# Patient Record
Sex: FEMALE | Race: BLACK OR AFRICAN AMERICAN | NOT HISPANIC OR LATINO | Employment: OTHER | URBAN - METROPOLITAN AREA
[De-identification: names, ages, dates, MRNs, and addresses within clinical notes are randomized per-mention and may not be internally consistent; named-entity substitution may affect disease eponyms.]

---

## 2017-02-02 ENCOUNTER — LAB VISIT (OUTPATIENT)
Dept: LAB | Facility: HOSPITAL | Age: 47
End: 2017-02-02
Attending: OBSTETRICS & GYNECOLOGY
Payer: MEDICAID

## 2017-02-02 ENCOUNTER — OFFICE VISIT (OUTPATIENT)
Dept: OBSTETRICS AND GYNECOLOGY | Facility: CLINIC | Age: 47
End: 2017-02-02
Payer: MEDICAID

## 2017-02-02 VITALS
SYSTOLIC BLOOD PRESSURE: 123 MMHG | HEIGHT: 65 IN | BODY MASS INDEX: 32.06 KG/M2 | DIASTOLIC BLOOD PRESSURE: 76 MMHG | WEIGHT: 192.44 LBS

## 2017-02-02 DIAGNOSIS — Z00.00 ANNUAL PHYSICAL EXAM: Primary | ICD-10-CM

## 2017-02-02 DIAGNOSIS — K62.89 IDIOPATHIC PROCTITIS: ICD-10-CM

## 2017-02-02 DIAGNOSIS — D25.9 UTERINE LEIOMYOMA, UNSPECIFIED LOCATION: ICD-10-CM

## 2017-02-02 LAB
BASOPHILS # BLD AUTO: 0.02 K/UL
BASOPHILS NFR BLD: 0.2 %
CANCER AG125 SERPL-ACNC: 14 U/ML
DIFFERENTIAL METHOD: ABNORMAL
EOSINOPHIL # BLD AUTO: 0.1 K/UL
EOSINOPHIL NFR BLD: 0.7 %
ERYTHROCYTE [DISTWIDTH] IN BLOOD BY AUTOMATED COUNT: 14.1 %
HCT VFR BLD AUTO: 35.4 %
HGB BLD-MCNC: 11.7 G/DL
LYMPHOCYTES # BLD AUTO: 1.8 K/UL
LYMPHOCYTES NFR BLD: 21.7 %
MCH RBC QN AUTO: 28 PG
MCHC RBC AUTO-ENTMCNC: 33.1 %
MCV RBC AUTO: 85 FL
MONOCYTES # BLD AUTO: 0.5 K/UL
MONOCYTES NFR BLD: 5.6 %
NEUTROPHILS # BLD AUTO: 6 K/UL
NEUTROPHILS NFR BLD: 71.8 %
PLATELET # BLD AUTO: 298 K/UL
PMV BLD AUTO: 10.3 FL
RBC # BLD AUTO: 4.18 M/UL
TSH SERPL DL<=0.005 MIU/L-ACNC: 1.78 UIU/ML
WBC # BLD AUTO: 8.33 K/UL

## 2017-02-02 PROCEDURE — 99999 PR PBB SHADOW E&M-EST. PATIENT-LVL III: CPT | Mod: PBBFAC,,, | Performed by: OBSTETRICS & GYNECOLOGY

## 2017-02-02 PROCEDURE — 86304 IMMUNOASSAY TUMOR CA 125: CPT

## 2017-02-02 PROCEDURE — 36415 COLL VENOUS BLD VENIPUNCTURE: CPT

## 2017-02-02 PROCEDURE — 99396 PREV VISIT EST AGE 40-64: CPT | Mod: S$PBB,,, | Performed by: OBSTETRICS & GYNECOLOGY

## 2017-02-02 PROCEDURE — 84443 ASSAY THYROID STIM HORMONE: CPT

## 2017-02-02 PROCEDURE — 85025 COMPLETE CBC W/AUTO DIFF WBC: CPT

## 2017-02-02 RX ORDER — CLOTRIMAZOLE AND BETAMETHASONE DIPROPIONATE 10; .64 MG/G; MG/G
CREAM TOPICAL
Qty: 15 G | Refills: 1 | Status: ON HOLD | OUTPATIENT
Start: 2017-02-02 | End: 2017-03-08

## 2017-02-02 NOTE — PROGRESS NOTES
Subjective:      Chief Complaint:  FIBROID  Chief Complaint   Patient presents with    Gynecologic Exam       Menstrual History:    OB History      Para Term  AB TAB SAB Ectopic Multiple Living    2         2          Menarche age: 13     Patient's last menstrual period was 2017.           Objective:        History of Present Illness AND  Examination detailed DICTATE:         HISTORY OF PRESENT ILLNESS:  The patient is 46 years of age.  The patient is for   annual exam,  2, para 2, tubal ligation.  No serious medical problems.    The patient was diagnosed with fibroids, in last exam was referred to Dr. Lima for possibly hysterectomy, however, the patient did not do it.  Pap   smear in  negative.  The patient had a mammogram this morning, report is not   back yet.  The patient's cycles are regular without any problem.  No heavy   bleeding.  The patient has no problem, no complaint.    REVIEW OF SYSTEMS:  HEAD, EAR, EYES, NOSE, AND THROAT:  Negative.  CARDIORESPIRATORY:  Negative.  GASTROINTESTINAL:  Negative.  GENITOURINARY:  See present illness.  NEUROMUSCULAR:  No problem or complaint.    PHYSICAL EXAMINATION:  GENERAL:  Well-developed, well-nourished, alert, oriented female, not in acute   distress.  VITAL SIGNS:  Blood pressure 123/73, weight 192 pounds.  HEAD:  Normocephalic.  EYES:  React.  NECK:  Supple.  Thyroid is not palpable.  No nodes.  CHEST:  Clear.  HEART:  Regular sinus rhythm, no murmur.  BREASTS:  Multiple nodular areas, possible cystic fibroadenomas.  No fixation.    No skin changes, retraction, nipple changes.  Await the report on mammogram to   see if we have to do any followup.  ABDOMEN:  Upper abdomen normal.  Lower abdomen, pelvic mass almost to the   umbilicus.  No guarding, rebound or tenderness.  PELVIC:  External normal.  Vulva normal.  [place], urethral and [place] glands   are negative.  Vagina white mucoid discharge.  Cervix clear.  Uterus is    enlarged, markedly almost to the umbilicus about 20-24 weeks' size pregnancy.      was increased in size from previous exam.  Adnexa not palpable.  RECTAL:  Negative.  MUSCULOSKELETAL:  Negative.  NEUROLOGIC:  Grossly normal.  SKIN:  Clear.    IMPRESSION:  Fibroid tumor.    PLAN:  We will repeat pelvic ultrasound.  We will do CBC, CA-125.  We will see   the patient back within a week to 10 days; however, my recommendation will be   hysterectomy because of complications of fibroid.        /ls [id number] blank(s)        FELIPA/IN  dd: 02/02/2017 09:42:12 (CST)  td: 02/02/2017 21:58:09 (CST)  Doc ID   #[id number]  Job ID #[id number]    CC:         Assessment:      Diagnosis: ANNUAL   EXAM    FIBROID   ANAL   PRURITIS       Plan:      Return in 2  weeks

## 2017-02-02 NOTE — PATIENT INSTRUCTIONS

## 2017-02-07 ENCOUNTER — HOSPITAL ENCOUNTER (OUTPATIENT)
Dept: RADIOLOGY | Facility: HOSPITAL | Age: 47
Discharge: HOME | End: 2017-02-07
Attending: OBSTETRICS & GYNECOLOGY
Payer: MEDICAID

## 2017-02-07 DIAGNOSIS — D25.9 UTERINE LEIOMYOMA, UNSPECIFIED LOCATION: ICD-10-CM

## 2017-02-07 PROCEDURE — 76856 US EXAM PELVIC COMPLETE: CPT | Mod: 26,,, | Performed by: RADIOLOGY

## 2017-02-07 PROCEDURE — 76856 US EXAM PELVIC COMPLETE: CPT | Mod: TC

## 2017-02-07 PROCEDURE — 76830 TRANSVAGINAL US NON-OB: CPT | Mod: 26,,, | Performed by: RADIOLOGY

## 2017-02-09 ENCOUNTER — OFFICE VISIT (OUTPATIENT)
Dept: OBSTETRICS AND GYNECOLOGY | Facility: CLINIC | Age: 47
End: 2017-02-09
Payer: MEDICAID

## 2017-02-09 VITALS
WEIGHT: 191.81 LBS | DIASTOLIC BLOOD PRESSURE: 78 MMHG | HEIGHT: 65 IN | BODY MASS INDEX: 31.96 KG/M2 | SYSTOLIC BLOOD PRESSURE: 125 MMHG

## 2017-02-09 DIAGNOSIS — D64.9 ANEMIA, UNSPECIFIED TYPE: ICD-10-CM

## 2017-02-09 DIAGNOSIS — D25.9 UTERINE LEIOMYOMA, UNSPECIFIED LOCATION: Primary | ICD-10-CM

## 2017-02-09 PROCEDURE — 99212 OFFICE O/P EST SF 10 MIN: CPT | Mod: S$PBB,,, | Performed by: OBSTETRICS & GYNECOLOGY

## 2017-02-09 PROCEDURE — 99212 OFFICE O/P EST SF 10 MIN: CPT | Mod: PBBFAC | Performed by: OBSTETRICS & GYNECOLOGY

## 2017-02-09 PROCEDURE — 99999 PR PBB SHADOW E&M-EST. PATIENT-LVL II: CPT | Mod: PBBFAC,,, | Performed by: OBSTETRICS & GYNECOLOGY

## 2017-02-09 NOTE — MR AVS SNAPSHOT
"    [facility] [place] - [facility]/ GYN  [address]  [address]  [place] [address]  Phone: [phone]                  Bri Roque   2017 1:00 PM   Office Visit    Description:  Female : 1970   Provider:  Mukesh Hui MD   Department:  [facility] [place] - [facility]/ GYN           Reason for Visit     Results           Diagnoses this Visit        Comments    Uterine leiomyoma, unspecified location    -  Primary     Anemia, unspecified type                To Do List           Future Appointments        Provider Department Dept Phone    2017 2:30 PM Talat Lima MD [facility] [place] [facility] -[phone]      Goals (5 Years of Data)     None      Follow-Up and Disposition     Return in about 6 months (around 2017).      [facility]     [facility] Care [facility] -  Assistance  Registered nurses in the [facility] provide clinical advisement, health education, appointment booking, and other advisory services.  Call for this free service at [phone].             Medications                Verify that the below list of medications is an accurate representation of the medications you are currently taking.  If none reported, the list may be blank. If incorrect, please contact your healthcare provider. Carry this list with you in case of emergency.           Current Medications     clindamycin (CLEOCIN T) 1 % Swab Apply topically 2 (two) times daily. CLEANSE FACE DAILY.    clindamycin (CLEOCIN) 150 MG capsule TK ONE C PO  Q 6 H FOR 5 DAYS FOR INFECTION    clotrimazole-betamethasone 1-0.05% (LOTRISONE) cream Apply to affected area 2 times daily    hydrocodone-acetaminophen 5-325mg (NORCO) 5-325 mg per tablet TK 1 T PO  Q 6 H PRN P. NO DRIVING/WORKING    promethazine (PHENERGAN) 25 MG tablet TK 1 T PO  Q 6 H PRN N. NO DRIVING/WORKING           Clinical Reference Information           Your Vitals Were     BP Height Weight Last Period BMI    125/78 5' 5" (1.651 m) 87 kg (191 lb 12.8 oz) " 01/09/2017 31.92 kg/m2      Blood Pressure          Most Recent Value    BP  125/78      Allergies as of 2/9/2017     No Known Allergies      Immunizations Administered on Date of Encounter - 2/9/2017     None      [facility] Sign-Up     Activating your [facility] account is as easy as 1-2-3!     1) Visit [email or web address], select Sign Up Now, enter this activation code and your date of birth, then select Next.  [email or web address]  Expires: 3/19/2017  9:13 AM      2) Create a username and password to use when you visit [facility] in the future and select a security question in case you lose your password and select Next.    3) Enter your e-mail address and click Sign Up!    Additional Information  If you have questions, please e-mail [email or web address] or call [phone] to talk to our [facility] staff. Remember, [facility] is NOT to be used for urgent needs. For medical emergencies, dial 911.         Language Assistance Services     ATTENTION: Language assistance services are available, free of charge. Please call [phone].      ATENCIÓN: Si habla español, tiene a garcia disposición servicios gratuitos de asistencia lingüística. Llame al [phone].     CHÚ Ý: N?u b?n nói Ti?ng Vi?t, có các d?ch v? h? tr? ngôn ng? mi?n phí dành cho b?n. G?i s? [phone].         [facility] [place] complies with applicable Federal civil rights laws and does not discriminate on the basis of race, color, national origin, age, disability, or sex.

## 2017-02-09 NOTE — PROGRESS NOTES
Subjective:      Chief Complaint:  fibriOIDS  Chief Complaint   Patient presents with    Results       Menstrual History:    OB History      Para Term  AB TAB SAB Ectopic Multiple Living    2         2          Menarche age: 13     Patient's last menstrual period was 2017.           Objective:        History of Present Illness AND  Examination detailed DICTATE:       The patient is 46 years of age.  She was here for previous annual examination.    She is known to be having fibroid tumors.  She is a  2, para 2.  Her   menstrual cycles appear regular.  Bleeding is not very extensive.  Last   menstrual period was 2017.  The patient received the benefit of repeat   pelvic on ultrasonography, which indicated the fibroid has increased in size   from previous exam in .  Also, the patient is slightly anemic.  Refer the   patient to Dr. Lima for possible hysterectomy, salpingectomy.  As previously   discussed with the patient, recommendation is removal of the uterus, possible   supracervical with bilateral salpingectomy.      FELIPA/IN  dd: 2017 13:13:57 (CST)  td: 02/10/2017 07:19:52 (CST)  Doc ID   #[id number]  Job ID #[id number]    CC:           Assessment:      Diagnosis: anemia  FIBROID       Plan:      Return in 4  weeks

## 2017-02-22 ENCOUNTER — OFFICE VISIT (OUTPATIENT)
Dept: OBSTETRICS AND GYNECOLOGY | Facility: CLINIC | Age: 47
End: 2017-02-22
Payer: MEDICAID

## 2017-02-22 VITALS
BODY MASS INDEX: 32.32 KG/M2 | SYSTOLIC BLOOD PRESSURE: 122 MMHG | WEIGHT: 194 LBS | HEIGHT: 65 IN | DIASTOLIC BLOOD PRESSURE: 70 MMHG

## 2017-02-22 DIAGNOSIS — R10.2 PELVIC PAIN IN FEMALE: Primary | ICD-10-CM

## 2017-02-22 DIAGNOSIS — D25.9 UTERINE LEIOMYOMA, UNSPECIFIED LOCATION: ICD-10-CM

## 2017-02-22 DIAGNOSIS — N92.1 MENORRHAGIA WITH IRREGULAR CYCLE: ICD-10-CM

## 2017-02-22 DIAGNOSIS — N94.6 DYSMENORRHEA: ICD-10-CM

## 2017-02-22 DIAGNOSIS — N85.2 ENLARGED UTERUS: ICD-10-CM

## 2017-02-22 PROCEDURE — 99213 OFFICE O/P EST LOW 20 MIN: CPT | Mod: PBBFAC | Performed by: OBSTETRICS & GYNECOLOGY

## 2017-02-22 PROCEDURE — 99999 PR PBB SHADOW E&M-EST. PATIENT-LVL III: CPT | Mod: PBBFAC,,, | Performed by: OBSTETRICS & GYNECOLOGY

## 2017-02-22 PROCEDURE — 99213 OFFICE O/P EST LOW 20 MIN: CPT | Mod: S$PBB,,, | Performed by: OBSTETRICS & GYNECOLOGY

## 2017-02-22 NOTE — MR AVS SNAPSHOT
"    [facility] [place] - [facility]/ GYN  [address]  [address]  [place] [address]  Phone: [phone]                  Bri Roque   2017 9:00 AM   Office Visit    Description:  Female : 1970   Provider:  Talat Lima MD   Department:  [facility] [place] - [facility]/ GYN           Reason for Visit     surgery consult                To Do List           Goals (5 Years of Data)     None      [facility]     [facility] Care [facility] -  Assistance  Registered nurses in the [facility] provide clinical advisement, health education, appointment booking, and other advisory services.  Call for this free service at [phone].             Medications                Verify that the below list of medications is an accurate representation of the medications you are currently taking.  If none reported, the list may be blank. If incorrect, please contact your healthcare provider. Carry this list with you in case of emergency.           Current Medications     clindamycin (CLEOCIN) 150 MG capsule TK ONE C PO  Q 6 H FOR 5 DAYS FOR INFECTION    clotrimazole-betamethasone 1-0.05% (LOTRISONE) cream Apply to affected area 2 times daily    hydrocodone-acetaminophen 5-325mg (NORCO) 5-325 mg per tablet TK 1 T PO  Q 6 H PRN P. NO DRIVING/WORKING    promethazine (PHENERGAN) 25 MG tablet TK 1 T PO  Q 6 H PRN N. NO DRIVING/WORKING    clindamycin (CLEOCIN T) 1 % Swab Apply topically 2 (two) times daily. CLEANSE FACE DAILY.           Clinical Reference Information           Your Vitals Were     BP Height Weight Last Period BMI    122/70 5' 5" (1.651 m) 88 kg (194 lb) 2017 32.28 kg/m2      Blood Pressure          Most Recent Value    BP  122/70      Allergies as of 2017     No Known Allergies      Immunizations Administered on Date of Encounter - 2017     None      [facility] Sign-Up     Activating your [facility] account is as easy as 1-2-3!     1) Visit [email or web address], select Sign Up Now, " enter this activation code and your date of birth, then select Next.  [email or web address]  Expires: 3/19/2017  9:13 AM      2) Create a username and password to use when you visit [facility] in the future and select a security question in case you lose your password and select Next.    3) Enter your e-mail address and click Sign Up!    Additional Information  If you have questions, please e-mail [email or web address] or call [phone] to talk to our [facility] staff. Remember, [facility] is NOT to be used for urgent needs. For medical emergencies, dial 911.         Language Assistance Services     ATTENTION: Language assistance services are available, free of charge. Please call [phone].      ATENCIÓN: Si rosaura carroll, tiene a garcia disposición servicios gratuitos de asistencia lingüística. Llame al [phone].     CHÚ Ý: N?u b?n nói Ti?ng Vi?t, có các d?ch v? h? tr? ngôn ng? mi?n phí dành cho b?n. G?i s? [phone].         [facility] complies with applicable Federal civil rights laws and does not discriminate on the basis of race, color, national origin, age, disability, or sex.

## 2017-02-23 NOTE — PROGRESS NOTES
CC:  Referral from Dr. Hui    HPI:  47 yro  presents on referral from Dr. Hui to discuss surgery due to fibroids and very heavy menses.  Pt also admits cramping with menses.    Sono:  Results: Transabdominal   ultrasound of the pelvis obtained.  Patient refused transvaginal ultrasound.    The uterus  measures  14.4 x 5.1 x 10.4 cm .  The artery measured uterine fibroids, the largest measures 9.8 cm.  The endometrium measures 8mm, which is normal.   The right ovary measures  3.2 x 1.8 x 2.2 cm .     The left ovary measures 3.7 x 2.2 x 3.3 cm. Follicles seen.  Normal flow is seen to the bilateral ovaries.  No free fluid.   Impression     1. Uterine fibroids     Vitals:    17 0912   BP: 122/70     Physical Exam   Constitutional: She is oriented to person, place, and time. She appears well-developed and well-nourished.   HENT:   Head: Normocephalic and atraumatic.   Neck: Normal range of motion.   Cardiovascular: Normal rate.    Pulmonary/Chest: Effort normal.   Abdominal: Soft.   Neurological: She is alert and oriented to person, place, and time. No cranial nerve deficit. Coordination normal.   Psychiatric: She has a normal mood and affect. Her behavior is normal. Judgment and thought content normal.   Vitals reviewed.    30 minute discussion  Pt desires hysterectomy.  Due to nearly 10 cm fibroid, laparotomy is recommended.  Discussed total vs partial hyst and pt desires SAH    Assessment/Plan  1. Pelvic pain in female  Case Request Operating Room: HYSTERECTOMY-SUPRA-CERVICAL   2. Uterine leiomyoma, unspecified location  Case Request Operating Room: HYSTERECTOMY-SUPRA-CERVICAL   3. Menorrhagia with irregular cycle  Case Request Operating Room: HYSTERECTOMY-SUPRA-CERVICAL   4. Dysmenorrhea  Case Request Operating Room: HYSTERECTOMY-SUPRA-CERVICAL   5. Enlarged uterus  Case Request Operating Room: HYSTERECTOMY-SUPRA-CERVICAL     surg scheduled 3/8/17 with pre-op a few days prior

## 2017-03-06 ENCOUNTER — OFFICE VISIT (OUTPATIENT)
Dept: OBSTETRICS AND GYNECOLOGY | Facility: CLINIC | Age: 47
End: 2017-03-06
Payer: MEDICAID

## 2017-03-06 ENCOUNTER — HOSPITAL ENCOUNTER (OUTPATIENT)
Dept: PREADMISSION TESTING | Facility: HOSPITAL | Age: 47
Discharge: HOME | End: 2017-03-06
Attending: OBSTETRICS & GYNECOLOGY
Payer: MEDICAID

## 2017-03-06 VITALS
RESPIRATION RATE: 20 BRPM | SYSTOLIC BLOOD PRESSURE: 146 MMHG | OXYGEN SATURATION: 99 % | HEIGHT: 65 IN | HEART RATE: 67 BPM | TEMPERATURE: 98 F | WEIGHT: 195.25 LBS | BODY MASS INDEX: 32.53 KG/M2 | DIASTOLIC BLOOD PRESSURE: 96 MMHG

## 2017-03-06 VITALS
HEIGHT: 65 IN | WEIGHT: 194 LBS | SYSTOLIC BLOOD PRESSURE: 128 MMHG | DIASTOLIC BLOOD PRESSURE: 70 MMHG | BODY MASS INDEX: 32.32 KG/M2

## 2017-03-06 DIAGNOSIS — D64.9 ANEMIA, UNSPECIFIED TYPE: Primary | ICD-10-CM

## 2017-03-06 DIAGNOSIS — N92.1 MENORRHAGIA WITH IRREGULAR CYCLE: ICD-10-CM

## 2017-03-06 DIAGNOSIS — N94.6 DYSMENORRHEA: ICD-10-CM

## 2017-03-06 DIAGNOSIS — D25.9 UTERINE LEIOMYOMA, UNSPECIFIED LOCATION: ICD-10-CM

## 2017-03-06 DIAGNOSIS — R10.2 PELVIC PAIN IN FEMALE: ICD-10-CM

## 2017-03-06 DIAGNOSIS — Z01.818 PREOPERATIVE TESTING: Primary | ICD-10-CM

## 2017-03-06 PROBLEM — Z00.00 ANNUAL PHYSICAL EXAM: Status: RESOLVED | Noted: 2017-02-02 | Resolved: 2017-03-06

## 2017-03-06 LAB
ANION GAP SERPL CALC-SCNC: 7 MMOL/L
BASOPHILS # BLD AUTO: 0.03 K/UL
BASOPHILS NFR BLD: 0.3 %
BUN SERPL-MCNC: 12 MG/DL
CALCIUM SERPL-MCNC: 9.2 MG/DL
CHLORIDE SERPL-SCNC: 104 MMOL/L
CO2 SERPL-SCNC: 28 MMOL/L
CREAT SERPL-MCNC: 1 MG/DL
DIFFERENTIAL METHOD: ABNORMAL
EOSINOPHIL # BLD AUTO: 0.1 K/UL
EOSINOPHIL NFR BLD: 1.3 %
ERYTHROCYTE [DISTWIDTH] IN BLOOD BY AUTOMATED COUNT: 13.9 %
EST. GFR  (AFRICAN AMERICAN): >60 ML/MIN/1.73 M^2
EST. GFR  (NON AFRICAN AMERICAN): >60 ML/MIN/1.73 M^2
GLUCOSE SERPL-MCNC: 126 MG/DL
HCT VFR BLD AUTO: 35.2 %
HGB BLD-MCNC: 11.5 G/DL
LYMPHOCYTES # BLD AUTO: 1.8 K/UL
LYMPHOCYTES NFR BLD: 20 %
MCH RBC QN AUTO: 27.6 PG
MCHC RBC AUTO-ENTMCNC: 32.7 %
MCV RBC AUTO: 84 FL
MONOCYTES # BLD AUTO: 0.5 K/UL
MONOCYTES NFR BLD: 5.6 %
NEUTROPHILS # BLD AUTO: 6.6 K/UL
NEUTROPHILS NFR BLD: 72.6 %
PLATELET # BLD AUTO: 347 K/UL
PMV BLD AUTO: 10.6 FL
POTASSIUM SERPL-SCNC: 3.3 MMOL/L
RBC # BLD AUTO: 4.17 M/UL
SODIUM SERPL-SCNC: 139 MMOL/L
WBC # BLD AUTO: 9.07 K/UL

## 2017-03-06 PROCEDURE — 99499 UNLISTED E&M SERVICE: CPT | Mod: S$PBB,,, | Performed by: OBSTETRICS & GYNECOLOGY

## 2017-03-06 PROCEDURE — 99212 OFFICE O/P EST SF 10 MIN: CPT | Mod: PBBFAC | Performed by: OBSTETRICS & GYNECOLOGY

## 2017-03-06 PROCEDURE — 99999 PR PBB SHADOW E&M-EST. PATIENT-LVL II: CPT | Mod: PBBFAC,,, | Performed by: OBSTETRICS & GYNECOLOGY

## 2017-03-06 RX ORDER — KETOROLAC TROMETHAMINE 30 MG/ML
30 INJECTION, SOLUTION INTRAMUSCULAR; INTRAVENOUS EVERY 6 HOURS
Status: CANCELLED | OUTPATIENT
Start: 2017-03-06 | End: 2017-03-07

## 2017-03-06 RX ORDER — IBUPROFEN 600 MG/1
800 TABLET ORAL EVERY 8 HOURS PRN
Status: CANCELLED | OUTPATIENT
Start: 2017-03-06

## 2017-03-06 RX ORDER — SODIUM CHLORIDE AND POTASSIUM CHLORIDE 150; 900 MG/100ML; MG/100ML
INJECTION, SOLUTION INTRAVENOUS CONTINUOUS
Status: CANCELLED | OUTPATIENT
Start: 2017-03-06

## 2017-03-06 RX ORDER — ONDANSETRON 4 MG/1
8 TABLET, ORALLY DISINTEGRATING ORAL EVERY 8 HOURS PRN
Status: CANCELLED | OUTPATIENT
Start: 2017-03-06

## 2017-03-06 RX ORDER — IBUPROFEN 200 MG
800 TABLET ORAL EVERY 8 HOURS
Status: CANCELLED | OUTPATIENT
Start: 2017-03-07

## 2017-03-06 NOTE — IP AVS SNAPSHOT
[place] [address] [place] [address]  Phone: [phone]           Patient Discharge Instructions    Our goal is to set you up for success. This packet includes information on your condition, medications, and your home care. It will help you to care for yourself so you don't get sicker.     Please ask your nurse if you have any questions.        There are many details to remember when preparing for your surgery. Here is what you will need to do, please ask your nurse if there are more specific instructions and if you have any questions:    1. 24 hours before procedure Do not smoke or drink alcoholic beverages 24 hours prior to your procedure    2. Eating before procedure Do not eat or drink anything 8 hours before your procedure - this includes gum, mints, and candy.     3. Day of procedure Please remove all jewelry for the procedure. If you wear contact lenses, dentures, hearing aids or glasses, bring a container to put them in during your surgery and give to a family member for safekeeping.  If your doctor has scheduled you for an overnight stay, bring a small overnight bag with any personal items that you need.    4. After procedure Make arrangements in advance for transportation home by a responsible adult. It is not safe to drive a vehicle during the 24 hours following surgery.     PLEASE NOTE: You may be contacted the day before your surgery to confirm your surgery date and arrival time. The Surgery schedule has many variables which may affect the time of your surgery case. Family members should be available if your surgery time changes.                [facility]  Unless otherwise directed by your provider, please contact [facility], our nurse care line that is available for 24/7 assistance.     [phone] (toll-free)    Registered nurses in the [facility] provide clinical advisement, health education, appointment booking, and other advisory  services.                    ** Verify the list of medication(s) below is accurate and up to date. Carry this with you in case of emergency. If your medications have changed, please notify your healthcare provider.             Medication List      TAKE these medications        Additional Info                      clotrimazole-betamethasone 1-0.05% cream   Commonly known as:  LOTRISONE   Quantity:  15 g   Refills:  1    Instructions:  Apply to affected area 2 times daily     Begin Date    AM    Noon    PM    Bedtime       IRON ORAL   Refills:  0   Dose:  1 tablet    Instructions:  Take 1 tablet by mouth 2 (two) times daily. 45 mg---last dose 3/5/17     Begin Date    AM    Noon    PM    Bedtime                  Please bring to all follow up appointments:    1. A copy of your discharge instructions.  2. All medicines you are currently taking in their original bottles.  3. Identification and insurance card.    Please arrive 15 minutes ahead of scheduled appointment time.    Please call 24 hours in advance if you must reschedule your appointment and/or time.        Your Scheduled Appointments     Mar 06, 2017  3:30 PM CST   Pre-Admit Testing Visit with PRE-ADMIT 1, [facility]    [address]  [facility] [address]   [phone]            Mar 06, 2017  4:00 PM CST   Pre OP with Talat Lima MD   [facility] [place] [facility] [place] [place] [place]    [address]  [address]  [facility] [id number] [phone]              Your Future Surgeries/Procedures     Mar 08, 2017   Surgery with Talat Lima MD   [facility] ([facility])    [address]  [facility] [address]   [phone]                  Discharge Instructions         Your surgery is scheduled for _WED 3/8___________________.    Call [phone] between 12 p.m. and 5 p.m. on   _TUESDAY 3/7______________ to find out your arrival time for the day of your  surgery.      Please report to SAME DAY SURGERY UNIT on the 2nd FLOOR at _______ a.m.  Use front door entrance. The doors open at 0530 am.      INSTRUCTIONS IMPORTANT!!!    X¨ Do not eat or drink after 12 midnight-including water. OK to brush teeth, no   gum, candy or mints!    For this procedure you will shower at home the night before and also the morning of surgery with HIBICLENS soap provided by the pre op nurse. Do not use this soap on your face or genitals. You will shower a third time here at the hospital on the morning of surgery. Rinse completely after each shower.  Please place clean linens on your bed the night before surgery. Please wear fresh clean clothing after each shower.  No shaving of procedural area at least 4-5 days before surgery due to                        increased risk of skin irritation and/or possible infection.          _X___  Prep instructions:   SHOWER   OTHER  ______________________    _X___  Do not wear makeup, including mascara. WEARING EYE MAKEUP MAY                   LEAD TO SERIOUS EYE INJURY during surgery.  _X___  No powder, lotions or creams to surgical area.  _X___  You may wear only deodorant on the day of surgery.  _X___  Please remove all jewelry, including piercings and leave at home.  _X___  No money or valuables needed. Please leave at home.  You may bring your           cell phone.    _X___  If going home the same day, arrange for a ride home. You will not be able to   drive if Anesthesia was used.  _  _X___  Wear loose fitting clothing. Allow for dressings, bandages.  _X___  Stop Aspirin, Ibuprofen, Motrin and Aleve at least 3-5 days before                       surgery, unless otherwise instructed by your doctor, or the nurse.              You MAY use Tylenol/acetaminophen until day of surgery.  .  _X___  Call MD for temperature above 101 degrees.        I have read or had read and explained to me, and understand the above information.    Additional comments or  "instructions:Please call   [phone] if you have any questions regarding the instructions above.                 Admission Information     Date & Time Provider Department CSN    3/6/2017  3:30 PM Talat Lima MD [facility] [id number]      Care Providers     Provider Role Specialty Primary office phone    Talat Lima MD Attending Provider Obstetrics [phone]      Your Vitals Were     BP Pulse Temp Resp Height Weight    146/96 (BP Location: Left arm, Patient Position: Sitting, BP Method: Automatic) 67 97.9 °F (36.6 °C) (Oral) 20 5' 4.5" (1.638 m) 88.6 kg (195 lb 4 oz)    Last Period SpO2 BMI          03/06/2017 99% 33 kg/m2        Recent Lab Values        8/10/2015                          10:00 AM           A1C 6.0                       Allergies as of 3/6/2017     No Known Allergies      Advance Directives     An advance directive is a document which, in the event you are no longer able to make decisions for yourself, tells your healthcare team what kind of treatment you do or do not want to receive, or who you would like to make those decisions for you.  If you do not currently have an advance directive, [facility] encourages you to create one.  For more information call:  [phone]), [phone] ([phone]),  or log on to [email or web address].        Language Assistance Services     ATTENTION: Language assistance services are available, free of charge. Please call [phone].      ATENCIÓN: Si habla español, tiene a garcia disposición servicios gratuitos de asistencia lingüística. Llame al [phone].     [facility] Ý: N?u b?n nói Ti?ng Vi?t, có các d?ch v? h? tr? ngôn ng? mi?n phí dành cho b?n. G?i s? [phone].        [facility] Sign-Up     Activating your [facility] account is as easy as 1-2-3!     1) Visit [email or web address], select Sign Up Now, enter this activation code and your date of birth, then select Next.  [email or web address]  Expires: 3/19/2017  9:13 AM  "     2) Create a username and password to use when you visit [facility] in the future and select a security question in case you lose your password and select Next.    3) Enter your e-mail address and click Sign Up!    Additional Information  If you have questions, please e-mail [email or web address] or call [phone] to talk to our [facility] staff. Remember, [facility] is NOT to be used for urgent needs. For medical emergencies, dial 911.          [facility] complies with applicable Federal civil rights laws and does not discriminate on the basis of race, color, national origin, age, disability, or sex.

## 2017-03-06 NOTE — DISCHARGE INSTRUCTIONS
Your surgery is scheduled for _WED 3/8___________________.    Call [phone] between 12 p.m. and 5 p.m. on   _TUESDAY 3/7______________ to find out your arrival time for the day of your surgery.      Please report to SAME DAY SURGERY UNIT on the 2nd FLOOR at _______ a.m.  Use front door entrance. The doors open at 0530 am.      INSTRUCTIONS IMPORTANT!!!    X¨ Do not eat or drink after 12 midnight-including water. OK to brush teeth, no   gum, candy or mints!    For this procedure you will shower at home the night before and also the morning of surgery with HIBICLENS soap provided by the pre op nurse. Do not use this soap on your face or genitals. You will shower a third time here at the hospital on the morning of surgery. Rinse completely after each shower.  Please place clean linens on your bed the night before surgery. Please wear fresh clean clothing after each shower.  No shaving of procedural area at least 4-5 days before surgery due to                        increased risk of skin irritation and/or possible infection.          _X___  Prep instructions:   SHOWER   OTHER  ______________________    _X___  Do not wear makeup, including mascara. WEARING EYE MAKEUP MAY                   LEAD TO SERIOUS EYE INJURY during surgery.  _X___  No powder, lotions or creams to surgical area.  _X___  You may wear only deodorant on the day of surgery.  _X___  Please remove all jewelry, including piercings and leave at home.  _X___  No money or valuables needed. Please leave at home.  You may bring your           cell phone.    _X___  If going home the same day, arrange for a ride home. You will not be able to   drive if Anesthesia was used.  _  _X___  Wear loose fitting clothing. Allow for dressings, bandages.  _X___  Stop Aspirin, Ibuprofen, Motrin and Aleve at least 3-5 days before                       surgery, unless otherwise instructed by your doctor, or the nurse.              You MAY use Tylenol/acetaminophen until day  of surgery.  .  _X___  Call MD for temperature above 101 degrees.        I have read or had read and explained to me, and understand the above information.    Additional comments or instructions:Please call   [phone] if you have any questions regarding the instructions above.

## 2017-03-06 NOTE — PLAN OF CARE
Pre operative instructions, medication directives and pain scales/post op pain management discussed with Ms Roque. All questions were answered. Patient re-assured regarding surgical procedure and day of surgery routine as needed. Ms Roque verbalized understanding of pre-op instructions.

## 2017-03-07 ENCOUNTER — ANESTHESIA EVENT (OUTPATIENT)
Dept: SURGERY | Facility: HOSPITAL | Age: 47
DRG: 743 | End: 2017-03-07
Payer: MEDICAID

## 2017-03-07 NOTE — PROGRESS NOTES
CC:  Pre-op SAH    HPI:  47 yro  presents for pre-op due to dysmenorrhea, menorrhagia and fibroids (largest > 9 cm).   Type of hysterectomies were discussed previously and we feel SAH is most appropriate.  Pt states she refuses all blood products regardless of situation in surgery.    Sono:  Results: Transabdominal   ultrasound of the pelvis obtained.  Patient refused transvaginal ultrasound.    The uterus  measures  14.4 x 5.1 x 10.4 cm .  The artery measured uterine fibroids, the largest measures 9.8 cm.  The endometrium measures 8mm, which is normal.   The right ovary measures  3.2 x 1.8 x 2.2 cm .     The left ovary measures 3.7 x 2.2 x 3.3 cm. Follicles seen.  Normal flow is seen to the bilateral ovaries.  No free fluid.    PMHx:  Reviewed  PSHx:  Reviewed  Meds:  Reviewed  Allergies:  NKDA    Review of Systems   Constitutional: Negative.    HENT: Negative.    Respiratory: Negative.    Cardiovascular: Negative.    Gastrointestinal: Negative.    Genitourinary: Negative.    Musculoskeletal: Negative.    Neurological: Negative.    Psychiatric/Behavioral: Negative.      Vitals:    17 1612   BP: 128/70     Physical Exam   Constitutional: She is oriented to person, place, and time. She appears well-developed and well-nourished. No distress.   HENT:   Head: Normocephalic and atraumatic.   Neck: Normal range of motion. No tracheal deviation present. No thyromegaly present.   Cardiovascular: Normal rate.    Pulmonary/Chest: Effort normal. No respiratory distress.   Abdominal: Soft. She exhibits no distension and no mass. There is no tenderness. There is no rebound and no guarding.   Musculoskeletal: Normal range of motion.   Neurological: She is alert and oriented to person, place, and time. No cranial nerve deficit. Coordination normal.   Skin: She is not diaphoretic.   Psychiatric: She has a normal mood and affect. Her behavior is normal. Judgment and thought content normal.   Vitals  reviewed.    Assessment  1.  Anemia  2.  Fibroids  3.  Menorrhagia  4.  Dysmenorrhea    Plan  Pt consented for SAH, pt signed consent to REFUSE ALL BLOOD Products.  Pt fully aware that she declines any type of transfusion of all blood products even in the event of impending death.

## 2017-03-08 ENCOUNTER — SURGERY (OUTPATIENT)
Age: 47
End: 2017-03-08

## 2017-03-08 ENCOUNTER — ANESTHESIA (OUTPATIENT)
Dept: SURGERY | Facility: HOSPITAL | Age: 47
DRG: 743 | End: 2017-03-08
Payer: MEDICAID

## 2017-03-08 ENCOUNTER — HOSPITAL ENCOUNTER (INPATIENT)
Facility: HOSPITAL | Age: 47
LOS: 2 days | Discharge: HOME | DRG: 743 | End: 2017-03-10
Attending: OBSTETRICS & GYNECOLOGY | Admitting: OBSTETRICS & GYNECOLOGY
Payer: MEDICAID

## 2017-03-08 DIAGNOSIS — N92.1 MENORRHAGIA WITH IRREGULAR CYCLE: ICD-10-CM

## 2017-03-08 DIAGNOSIS — N85.2 ENLARGED UTERUS: ICD-10-CM

## 2017-03-08 DIAGNOSIS — Z90.710 S/P COMPLETE HYSTERECTOMY: Primary | ICD-10-CM

## 2017-03-08 DIAGNOSIS — R10.2 PELVIC PAIN IN FEMALE: ICD-10-CM

## 2017-03-08 DIAGNOSIS — D25.9 UTERINE LEIOMYOMA, UNSPECIFIED LOCATION: ICD-10-CM

## 2017-03-08 DIAGNOSIS — N94.6 DYSMENORRHEA: ICD-10-CM

## 2017-03-08 DIAGNOSIS — D25.1 INTRAMURAL LEIOMYOMA OF UTERUS: ICD-10-CM

## 2017-03-08 DIAGNOSIS — D64.9 ANEMIA, UNSPECIFIED TYPE: ICD-10-CM

## 2017-03-08 LAB
ABO + RH BLD: NORMAL
B-HCG UR QL: NEGATIVE
BACTERIA #/AREA URNS HPF: NORMAL /HPF
BILIRUB UR QL STRIP: NEGATIVE
BLD GP AB SCN CELLS X3 SERPL QL: NORMAL
CLARITY UR: CLEAR
COLOR UR: YELLOW
GLUCOSE UR QL STRIP: NEGATIVE
HGB UR QL STRIP: ABNORMAL
KETONES UR QL STRIP: NEGATIVE
LEUKOCYTE ESTERASE UR QL STRIP: NEGATIVE
MICROSCOPIC COMMENT: NORMAL
NITRITE UR QL STRIP: NEGATIVE
PH UR STRIP: 6 [PH] (ref 5–8)
POCT GLUCOSE: 142 MG/DL (ref 70–110)
PROT UR QL STRIP: NEGATIVE
RBC #/AREA URNS HPF: 3 /HPF (ref 0–4)
SP GR UR STRIP: 1.02 (ref 1–1.03)
SQUAMOUS #/AREA URNS HPF: 1 /HPF
URN SPEC COLLECT METH UR: ABNORMAL
UROBILINOGEN UR STRIP-ACNC: NEGATIVE EU/DL
WBC #/AREA URNS HPF: 0 /HPF (ref 0–5)

## 2017-03-08 PROCEDURE — 88307 TISSUE EXAM BY PATHOLOGIST: CPT | Mod: 26,,, | Performed by: PATHOLOGY

## 2017-03-08 PROCEDURE — 11000001 HC ACUTE MED/SURG PRIVATE ROOM

## 2017-03-08 PROCEDURE — 37000008 HC ANESTHESIA 1ST 15 MINUTES: Performed by: OBSTETRICS & GYNECOLOGY

## 2017-03-08 PROCEDURE — 25000003 PHARM REV CODE 250: Performed by: OBSTETRICS & GYNECOLOGY

## 2017-03-08 PROCEDURE — 86900 BLOOD TYPING SEROLOGIC ABO: CPT

## 2017-03-08 PROCEDURE — 25000003 PHARM REV CODE 250: Performed by: NURSE ANESTHETIST, CERTIFIED REGISTERED

## 2017-03-08 PROCEDURE — 0UT90ZZ RESECTION OF UTERUS, OPEN APPROACH: ICD-10-PCS | Performed by: OBSTETRICS & GYNECOLOGY

## 2017-03-08 PROCEDURE — 27201423 OPTIME MED/SURG SUP & DEVICES STERILE SUPPLY: Performed by: OBSTETRICS & GYNECOLOGY

## 2017-03-08 PROCEDURE — 63600175 PHARM REV CODE 636 W HCPCS: Performed by: OBSTETRICS & GYNECOLOGY

## 2017-03-08 PROCEDURE — 63600175 PHARM REV CODE 636 W HCPCS

## 2017-03-08 PROCEDURE — D9220A PRA ANESTHESIA: Mod: ANES,,, | Performed by: ANESTHESIOLOGY

## 2017-03-08 PROCEDURE — 36000708 HC OR TIME LEV III 1ST 15 MIN: Performed by: OBSTETRICS & GYNECOLOGY

## 2017-03-08 PROCEDURE — 0UTC0ZZ RESECTION OF CERVIX, OPEN APPROACH: ICD-10-PCS | Performed by: OBSTETRICS & GYNECOLOGY

## 2017-03-08 PROCEDURE — 63600175 PHARM REV CODE 636 W HCPCS: Performed by: ANESTHESIOLOGY

## 2017-03-08 PROCEDURE — D9220A PRA ANESTHESIA: Mod: CRNA,,, | Performed by: NURSE ANESTHETIST, CERTIFIED REGISTERED

## 2017-03-08 PROCEDURE — 25000003 PHARM REV CODE 250: Performed by: ANESTHESIOLOGY

## 2017-03-08 PROCEDURE — 81000 URINALYSIS NONAUTO W/SCOPE: CPT

## 2017-03-08 PROCEDURE — 81025 URINE PREGNANCY TEST: CPT

## 2017-03-08 PROCEDURE — 58150 TOTAL HYSTERECTOMY: CPT | Mod: 80,,, | Performed by: OBSTETRICS & GYNECOLOGY

## 2017-03-08 PROCEDURE — 88307 TISSUE EXAM BY PATHOLOGIST: CPT | Performed by: PATHOLOGY

## 2017-03-08 PROCEDURE — 58150 TOTAL HYSTERECTOMY: CPT | Mod: ,,, | Performed by: OBSTETRICS & GYNECOLOGY

## 2017-03-08 PROCEDURE — 71000039 HC RECOVERY, EACH ADD'L HOUR: Performed by: OBSTETRICS & GYNECOLOGY

## 2017-03-08 PROCEDURE — 71000033 HC RECOVERY, INTIAL HOUR: Performed by: OBSTETRICS & GYNECOLOGY

## 2017-03-08 PROCEDURE — 36000709 HC OR TIME LEV III EA ADD 15 MIN: Performed by: OBSTETRICS & GYNECOLOGY

## 2017-03-08 PROCEDURE — 86850 RBC ANTIBODY SCREEN: CPT

## 2017-03-08 PROCEDURE — 36415 COLL VENOUS BLD VENIPUNCTURE: CPT

## 2017-03-08 PROCEDURE — 0UT70ZZ RESECTION OF BILATERAL FALLOPIAN TUBES, OPEN APPROACH: ICD-10-PCS | Performed by: OBSTETRICS & GYNECOLOGY

## 2017-03-08 PROCEDURE — 37000009 HC ANESTHESIA EA ADD 15 MINS: Performed by: OBSTETRICS & GYNECOLOGY

## 2017-03-08 PROCEDURE — 63600175 PHARM REV CODE 636 W HCPCS: Performed by: NURSE ANESTHETIST, CERTIFIED REGISTERED

## 2017-03-08 RX ORDER — LIDOCAINE HYDROCHLORIDE 10 MG/ML
1 INJECTION, SOLUTION EPIDURAL; INFILTRATION; INTRACAUDAL; PERINEURAL ONCE
Status: DISCONTINUED | OUTPATIENT
Start: 2017-03-08 | End: 2017-03-08 | Stop reason: HOSPADM

## 2017-03-08 RX ORDER — SODIUM CHLORIDE AND POTASSIUM CHLORIDE 150; 900 MG/100ML; MG/100ML
INJECTION, SOLUTION INTRAVENOUS CONTINUOUS
Status: DISCONTINUED | OUTPATIENT
Start: 2017-03-08 | End: 2017-03-08

## 2017-03-08 RX ORDER — HYDROMORPHONE HCL IN 0.9% NACL 6 MG/30 ML
PATIENT CONTROLLED ANALGESIA SYRINGE INTRAVENOUS CONTINUOUS
Status: DISCONTINUED | OUTPATIENT
Start: 2017-03-08 | End: 2017-03-09

## 2017-03-08 RX ORDER — DIPHENHYDRAMINE HYDROCHLORIDE 50 MG/ML
12.5 INJECTION INTRAMUSCULAR; INTRAVENOUS EVERY 4 HOURS PRN
Status: DISCONTINUED | OUTPATIENT
Start: 2017-03-08 | End: 2017-03-09

## 2017-03-08 RX ORDER — IBUPROFEN 400 MG/1
800 TABLET ORAL 3 TIMES DAILY
Status: DISCONTINUED | OUTPATIENT
Start: 2017-03-09 | End: 2017-03-10 | Stop reason: HOSPADM

## 2017-03-08 RX ORDER — MIDAZOLAM HYDROCHLORIDE 1 MG/ML
INJECTION, SOLUTION INTRAMUSCULAR; INTRAVENOUS
Status: DISCONTINUED | OUTPATIENT
Start: 2017-03-08 | End: 2017-03-08

## 2017-03-08 RX ORDER — DIPHENHYDRAMINE HYDROCHLORIDE 50 MG/ML
25 INJECTION INTRAMUSCULAR; INTRAVENOUS EVERY 4 HOURS PRN
Status: DISCONTINUED | OUTPATIENT
Start: 2017-03-08 | End: 2017-03-10 | Stop reason: HOSPADM

## 2017-03-08 RX ORDER — FENTANYL CITRATE 50 UG/ML
INJECTION, SOLUTION INTRAMUSCULAR; INTRAVENOUS
Status: DISCONTINUED | OUTPATIENT
Start: 2017-03-08 | End: 2017-03-08

## 2017-03-08 RX ORDER — ONDANSETRON 8 MG/1
8 TABLET, ORALLY DISINTEGRATING ORAL EVERY 8 HOURS PRN
Status: DISCONTINUED | OUTPATIENT
Start: 2017-03-08 | End: 2017-03-08

## 2017-03-08 RX ORDER — NEOSTIGMINE METHYLSULFATE 1 MG/ML
INJECTION, SOLUTION INTRAVENOUS
Status: DISCONTINUED | OUTPATIENT
Start: 2017-03-08 | End: 2017-03-08

## 2017-03-08 RX ORDER — BISACODYL 10 MG
10 SUPPOSITORY, RECTAL RECTAL DAILY PRN
Status: DISCONTINUED | OUTPATIENT
Start: 2017-03-08 | End: 2017-03-10 | Stop reason: HOSPADM

## 2017-03-08 RX ORDER — METOCLOPRAMIDE HYDROCHLORIDE 5 MG/ML
INJECTION INTRAMUSCULAR; INTRAVENOUS
Status: DISCONTINUED | OUTPATIENT
Start: 2017-03-08 | End: 2017-03-08

## 2017-03-08 RX ORDER — KETOROLAC TROMETHAMINE 30 MG/ML
30 INJECTION, SOLUTION INTRAMUSCULAR; INTRAVENOUS EVERY 6 HOURS
Status: COMPLETED | OUTPATIENT
Start: 2017-03-08 | End: 2017-03-09

## 2017-03-08 RX ORDER — IBUPROFEN 400 MG/1
800 TABLET ORAL EVERY 8 HOURS
Status: DISCONTINUED | OUTPATIENT
Start: 2017-03-09 | End: 2017-03-08

## 2017-03-08 RX ORDER — GLYCOPYRROLATE 0.2 MG/ML
INJECTION INTRAMUSCULAR; INTRAVENOUS
Status: DISCONTINUED | OUTPATIENT
Start: 2017-03-08 | End: 2017-03-08

## 2017-03-08 RX ORDER — NALOXONE HCL 0.4 MG/ML
0.02 VIAL (ML) INJECTION
Status: DISCONTINUED | OUTPATIENT
Start: 2017-03-08 | End: 2017-03-09

## 2017-03-08 RX ORDER — PHENYLEPHRINE HYDROCHLORIDE 10 MG/ML
INJECTION INTRAVENOUS
Status: DISCONTINUED | OUTPATIENT
Start: 2017-03-08 | End: 2017-03-08

## 2017-03-08 RX ORDER — ONDANSETRON 8 MG/1
8 TABLET, ORALLY DISINTEGRATING ORAL EVERY 8 HOURS PRN
Status: DISCONTINUED | OUTPATIENT
Start: 2017-03-08 | End: 2017-03-10 | Stop reason: HOSPADM

## 2017-03-08 RX ORDER — ONDANSETRON 2 MG/ML
INJECTION INTRAMUSCULAR; INTRAVENOUS
Status: DISCONTINUED | OUTPATIENT
Start: 2017-03-08 | End: 2017-03-08

## 2017-03-08 RX ORDER — DIPHENHYDRAMINE HCL 25 MG
25 CAPSULE ORAL EVERY 4 HOURS PRN
Status: DISCONTINUED | OUTPATIENT
Start: 2017-03-08 | End: 2017-03-10 | Stop reason: HOSPADM

## 2017-03-08 RX ORDER — SODIUM CHLORIDE, SODIUM LACTATE, POTASSIUM CHLORIDE, CALCIUM CHLORIDE 600; 310; 30; 20 MG/100ML; MG/100ML; MG/100ML; MG/100ML
INJECTION, SOLUTION INTRAVENOUS CONTINUOUS
Status: DISCONTINUED | OUTPATIENT
Start: 2017-03-08 | End: 2017-03-09

## 2017-03-08 RX ORDER — OXYCODONE AND ACETAMINOPHEN 5; 325 MG/1; MG/1
1 TABLET ORAL EVERY 4 HOURS PRN
Status: DISCONTINUED | OUTPATIENT
Start: 2017-03-08 | End: 2017-03-10 | Stop reason: HOSPADM

## 2017-03-08 RX ORDER — AMOXICILLIN 250 MG
1 CAPSULE ORAL 2 TIMES DAILY
Status: DISCONTINUED | OUTPATIENT
Start: 2017-03-08 | End: 2017-03-10 | Stop reason: HOSPADM

## 2017-03-08 RX ORDER — SODIUM CHLORIDE, SODIUM LACTATE, POTASSIUM CHLORIDE, CALCIUM CHLORIDE 600; 310; 30; 20 MG/100ML; MG/100ML; MG/100ML; MG/100ML
INJECTION, SOLUTION INTRAVENOUS CONTINUOUS
Status: DISCONTINUED | OUTPATIENT
Start: 2017-03-08 | End: 2017-03-08

## 2017-03-08 RX ORDER — KETOROLAC TROMETHAMINE 30 MG/ML
30 INJECTION, SOLUTION INTRAMUSCULAR; INTRAVENOUS EVERY 6 HOURS
Status: DISCONTINUED | OUTPATIENT
Start: 2017-03-08 | End: 2017-03-08

## 2017-03-08 RX ORDER — HYDROMORPHONE HYDROCHLORIDE 2 MG/ML
0.2 INJECTION, SOLUTION INTRAMUSCULAR; INTRAVENOUS; SUBCUTANEOUS EVERY 5 MIN PRN
Status: DISCONTINUED | OUTPATIENT
Start: 2017-03-08 | End: 2017-03-08 | Stop reason: HOSPADM

## 2017-03-08 RX ORDER — LIDOCAINE HCL/PF 100 MG/5ML
SYRINGE (ML) INTRAVENOUS
Status: DISCONTINUED | OUTPATIENT
Start: 2017-03-08 | End: 2017-03-08

## 2017-03-08 RX ORDER — CEFAZOLIN SODIUM 2 G/50ML
2 SOLUTION INTRAVENOUS
Status: DISCONTINUED | OUTPATIENT
Start: 2017-03-08 | End: 2017-03-08 | Stop reason: HOSPADM

## 2017-03-08 RX ORDER — DIPHENHYDRAMINE HYDROCHLORIDE 50 MG/ML
25 INJECTION INTRAMUSCULAR; INTRAVENOUS EVERY 6 HOURS PRN
Status: DISCONTINUED | OUTPATIENT
Start: 2017-03-08 | End: 2017-03-08 | Stop reason: HOSPADM

## 2017-03-08 RX ORDER — CEFAZOLIN SODIUM 2 G/50ML
SOLUTION INTRAVENOUS
Status: COMPLETED
Start: 2017-03-08 | End: 2017-03-08

## 2017-03-08 RX ORDER — ROCURONIUM BROMIDE 10 MG/ML
INJECTION, SOLUTION INTRAVENOUS
Status: DISCONTINUED | OUTPATIENT
Start: 2017-03-08 | End: 2017-03-08

## 2017-03-08 RX ORDER — PROPOFOL 10 MG/ML
VIAL (ML) INTRAVENOUS
Status: DISCONTINUED | OUTPATIENT
Start: 2017-03-08 | End: 2017-03-08

## 2017-03-08 RX ORDER — IBUPROFEN 400 MG/1
800 TABLET ORAL EVERY 8 HOURS PRN
Status: DISCONTINUED | OUTPATIENT
Start: 2017-03-08 | End: 2017-03-08 | Stop reason: HOSPADM

## 2017-03-08 RX ORDER — ONDANSETRON 2 MG/ML
4 INJECTION INTRAMUSCULAR; INTRAVENOUS EVERY 12 HOURS PRN
Status: DISCONTINUED | OUTPATIENT
Start: 2017-03-08 | End: 2017-03-09

## 2017-03-08 RX ORDER — ONDANSETRON 2 MG/ML
4 INJECTION INTRAMUSCULAR; INTRAVENOUS EVERY 8 HOURS PRN
Status: DISCONTINUED | OUTPATIENT
Start: 2017-03-08 | End: 2017-03-08 | Stop reason: HOSPADM

## 2017-03-08 RX ORDER — SODIUM CHLORIDE 0.9 % (FLUSH) 0.9 %
3 SYRINGE (ML) INJECTION
Status: DISCONTINUED | OUTPATIENT
Start: 2017-03-08 | End: 2017-03-08 | Stop reason: HOSPADM

## 2017-03-08 RX ORDER — OXYCODONE AND ACETAMINOPHEN 10; 325 MG/1; MG/1
1 TABLET ORAL EVERY 4 HOURS PRN
Status: DISCONTINUED | OUTPATIENT
Start: 2017-03-08 | End: 2017-03-10 | Stop reason: HOSPADM

## 2017-03-08 RX ADMIN — PHENYLEPHRINE HYDROCHLORIDE 100 MCG: 10 INJECTION INTRAVENOUS at 08:03

## 2017-03-08 RX ADMIN — PROPOFOL 180 MG: 10 INJECTION, EMULSION INTRAVENOUS at 07:03

## 2017-03-08 RX ADMIN — PHENYLEPHRINE HYDROCHLORIDE 100 MCG: 10 INJECTION INTRAVENOUS at 07:03

## 2017-03-08 RX ADMIN — PROMETHAZINE HYDROCHLORIDE 6.25 MG: 25 INJECTION INTRAMUSCULAR; INTRAVENOUS at 06:03

## 2017-03-08 RX ADMIN — FENTANYL CITRATE 50 MCG: 50 INJECTION INTRAMUSCULAR; INTRAVENOUS at 07:03

## 2017-03-08 RX ADMIN — HYDROMORPHONE HYDROCHLORIDE 0.2 MG: 2 INJECTION INTRAMUSCULAR; INTRAVENOUS; SUBCUTANEOUS at 09:03

## 2017-03-08 RX ADMIN — ONDANSETRON 4 MG: 2 INJECTION, SOLUTION INTRAMUSCULAR; INTRAVENOUS at 07:03

## 2017-03-08 RX ADMIN — FENTANYL CITRATE 25 MCG: 50 INJECTION INTRAMUSCULAR; INTRAVENOUS at 09:03

## 2017-03-08 RX ADMIN — GLYCOPYRROLATE 0.6 MG: 0.2 INJECTION, SOLUTION INTRAMUSCULAR; INTRAVENOUS at 08:03

## 2017-03-08 RX ADMIN — SODIUM CHLORIDE, SODIUM LACTATE, POTASSIUM CHLORIDE, AND CALCIUM CHLORIDE: .6; .31; .03; .02 INJECTION, SOLUTION INTRAVENOUS at 06:03

## 2017-03-08 RX ADMIN — ONDANSETRON 4 MG: 2 INJECTION INTRAMUSCULAR; INTRAVENOUS at 03:03

## 2017-03-08 RX ADMIN — METOCLOPRAMIDE 10 MG: 5 INJECTION, SOLUTION INTRAMUSCULAR; INTRAVENOUS at 07:03

## 2017-03-08 RX ADMIN — ROCURONIUM BROMIDE 15 MG: 10 INJECTION, SOLUTION INTRAVENOUS at 07:03

## 2017-03-08 RX ADMIN — KETOROLAC TROMETHAMINE 30 MG: 30 INJECTION, SOLUTION INTRAMUSCULAR at 12:03

## 2017-03-08 RX ADMIN — LIDOCAINE HYDROCHLORIDE 100 MG: 20 INJECTION, SOLUTION INTRAVENOUS at 07:03

## 2017-03-08 RX ADMIN — FENTANYL CITRATE 100 MCG: 50 INJECTION INTRAMUSCULAR; INTRAVENOUS at 07:03

## 2017-03-08 RX ADMIN — SODIUM CHLORIDE, SODIUM LACTATE, POTASSIUM CHLORIDE, AND CALCIUM CHLORIDE: .6; .31; .03; .02 INJECTION, SOLUTION INTRAVENOUS at 10:03

## 2017-03-08 RX ADMIN — CEFAZOLIN SODIUM 2 G: 2 SOLUTION INTRAVENOUS at 07:03

## 2017-03-08 RX ADMIN — SODIUM CHLORIDE, SODIUM LACTATE, POTASSIUM CHLORIDE, AND CALCIUM CHLORIDE: .6; .31; .03; .02 INJECTION, SOLUTION INTRAVENOUS at 08:03

## 2017-03-08 RX ADMIN — Medication: at 10:03

## 2017-03-08 RX ADMIN — ROCURONIUM BROMIDE 35 MG: 10 INJECTION, SOLUTION INTRAVENOUS at 07:03

## 2017-03-08 RX ADMIN — NEOSTIGMINE METHYLSULFATE 4.5 MG: 1 INJECTION INTRAVENOUS at 08:03

## 2017-03-08 RX ADMIN — KETOROLAC TROMETHAMINE 30 MG: 30 INJECTION, SOLUTION INTRAMUSCULAR at 05:03

## 2017-03-08 RX ADMIN — LIDOCAINE HYDROCHLORIDE 20 MG: 20 INJECTION, SOLUTION INTRAVENOUS at 08:03

## 2017-03-08 RX ADMIN — SODIUM CHLORIDE, SODIUM LACTATE, POTASSIUM CHLORIDE, AND CALCIUM CHLORIDE: .6; .31; .03; .02 INJECTION, SOLUTION INTRAVENOUS at 07:03

## 2017-03-08 RX ADMIN — PROPOFOL 20 MG: 10 INJECTION, EMULSION INTRAVENOUS at 07:03

## 2017-03-08 RX ADMIN — HYDROMORPHONE HYDROCHLORIDE 0.2 MG: 2 INJECTION INTRAMUSCULAR; INTRAVENOUS; SUBCUTANEOUS at 10:03

## 2017-03-08 RX ADMIN — MIDAZOLAM HYDROCHLORIDE 2 MG: 1 INJECTION, SOLUTION INTRAMUSCULAR; INTRAVENOUS at 07:03

## 2017-03-08 RX ADMIN — DOCUSATE SODIUM AND SENNOSIDES 1 TABLET: 8.6; 5 TABLET, FILM COATED ORAL at 09:03

## 2017-03-08 RX ADMIN — GLYCOPYRROLATE 0.2 MG: 0.2 INJECTION, SOLUTION INTRAMUSCULAR; INTRAVENOUS at 07:03

## 2017-03-08 NOTE — INTERVAL H&P NOTE
The patient has been examined and the H&P has been reviewed:    I concur with the findings and no changes have occurred since H&P was written.    Anesthesia/Surgery risks, benefits and alternative options discussed and understood by patient/family.          Active Hospital Problems    Diagnosis  POA    Anemia [D64.9]  Yes      Resolved Hospital Problems    Diagnosis Date Resolved POA   No resolved problems to display.

## 2017-03-08 NOTE — PLAN OF CARE
Problem: Patient Care Overview  Goal: Plan of Care Review  Outcome: Ongoing (interventions implemented as appropriate)  Plan of care reviewed. Frequent checks made to ensure safety and comfort. Bed low, call bell within reach. Will continue to monitor.

## 2017-03-08 NOTE — ANESTHESIA POSTPROCEDURE EVALUATION
"Anesthesia Post Evaluation    Patient: Bri Roque    Procedure(s) Performed: Procedure(s) (LRB):  HYSTERECTOMY-SUPRA-CERVICAL (N/A)    Final Anesthesia Type: general  Patient location during evaluation: PACU  Patient participation: Yes- Able to Participate  Level of consciousness: awake  Post-procedure vital signs: reviewed and stable  Pain management: adequate  Airway patency: patent  PONV status at discharge: No PONV  Anesthetic complications: no      Cardiovascular status: stable  Respiratory status: unassisted  Hydration status: euvolemic  Follow-up not needed.        Visit Vitals    /72    Pulse 70    Temp 36.4 °C (97.6 °F) (Oral)    Resp (!) 25    Ht 5' 4.5" (1.638 m)    Wt 88.6 kg (195 lb 4 oz)    LMP 03/06/2017    SpO2 99%    Breastfeeding No    BMI 33 kg/m2       Pain/Quynh Score: Pain Assessment Performed: Yes (3/8/2017  9:13 AM)  Presence of Pain: non-verbal indicators present (3/8/2017  9:13 AM)  Pain Rating Prior to Med Admin: 0 (3/8/2017 10:25 AM)  Quynh Score: 10 (3/8/2017  9:43 AM)      "

## 2017-03-08 NOTE — BRIEF OP NOTE
[facility]  Brief Operative Note    SUMMARY     Surgery Date: 3/8/2017     Surgeon(s) and Role:     * Talat Lima MD - Primary     * Landry Mark MD - Assisting        Pre-op Diagnosis:  Dysmenorrhea [N94.6]  Enlarged uterus [N85.2]  Pelvic pain in female [R10.2]  Menorrhagia with irregular cycle [N92.1]  Uterine leiomyoma, unspecified location [D25.9]    Post-op Diagnosis:  Post-Op Diagnosis Codes:     * Dysmenorrhea [N94.6]     * Enlarged uterus [N85.2]     * Pelvic pain in female [R10.2]     * Menorrhagia with irregular cycle [N92.1]     * Uterine leiomyoma, unspecified location [D25.9]    Procedure:  WALE with bilateral salpingectomy    Anesthesia: General    Description of Procedure: uncomplicated WALE with bilateral salpingectomy    Description of the findings of the procedure: grossly enlarged, fibroid uterus    Estimated Blood Loss: 500 mL         Specimens:   Specimen (12h ago through future)    Start     Ordered    Pending  Specimen to Pathology - Surgery  Once     Comments:  Uterus    Cervix    Bilateral tubes    Pending

## 2017-03-08 NOTE — ANESTHESIA PREPROCEDURE EVALUATION
03/08/2017  Bri Roque is a 47 y.o., female   Pre-operative evaluation for Procedure(s) (LRB):  HYSTERECTOMY-SUPRA-CERVICAL (N/A)    Bri Roque is a 47 y.o. female presents for hysterectomy secondary to dysmenorrhea, menorrhagia and fibroids (largest > 9 cm). Pt states she refuses all blood products regardless of situation in surgery, Dr. Lima is aware.  Body mass index is 33 kg/(m^2).      Patient Active Problem List   Diagnosis    Uterine leiomyoma    Pelvic pain in female    Idiopathic proctitis    Anemia    Menorrhagia with irregular cycle    Dysmenorrhea    Enlarged uterus       Review of patient's allergies indicates:  No Known Allergies    No current facility-administered medications on file prior to encounter.      Current Outpatient Prescriptions on File Prior to Encounter   Medication Sig Dispense Refill    clotrimazole-betamethasone 1-0.05% (LOTRISONE) cream Apply to affected area 2 times daily 15 g 1       Past Surgical History:   Procedure Laterality Date    TUBAL LIGATION      VAGINAL DELIVERY       x 2       Social History     Social History    Marital status: Single     Spouse name: N/A    Number of children: N/A    Years of education: N/A     Occupational History    Not on file.     Social History Main Topics    Smoking status: Never Smoker    Smokeless tobacco: Not on file    Alcohol use No    Drug use: No    Sexual activity: No     Other Topics Concern    Not on file     Social History Narrative         Vital Signs Range (Last 24H):  Temp:  [36.8 °C (98.2 °F)]   Pulse:  [67]   Resp:  [18]   BP: (146)/(79)   SpO2:  [99 %]       CBC:   Recent Labs      03/06/17   1530   WBC  9.07   RBC  4.17   HGB  11.5*   HCT  35.2*   PLT  347   MCV  84   MCH  27.6   MCHC  32.7       CMP:   Recent Labs      03/06/17   1530   NA  139   K  3.3*   CL  104   CO2  28   BUN   12   CREATININE  1.0   GLU  126*   CALCIUM  9.2         [facility] Anesthesia Evaluation    I have reviewed the Patient Summary Reports.      I have reviewed the Medications.     Review of Systems  Anesthesia Hx:  No problems with previous Anesthesia Denies Hx of Anesthetic complications  History of prior surgery of interest to airway management or planning: Denies Family Hx of Anesthesia complications.   Denies Personal Hx of Anesthesia complications.   Social:  No Alcohol Use, Non-Smoker    Hematology/Oncology:     Oncology Normal    -- Anemia: Hematology Comments: Mild anemia 11/35, patient refuses all blood products, surgeon aware    EENT/Dental:EENT/Dental Normal   Cardiovascular:  Cardiovascular Normal Exercise tolerance: good     Pulmonary:  Pulmonary Normal    Renal/:  Renal/ Normal     Hepatic/GI:  Hepatic/GI Normal    OB/GYN/PEDS:  Large fibroids   Neurological:  Neurology Normal    Endocrine:  Endocrine Normal    Psych:  Psychiatric Normal           Physical Exam  General:  Obesity    Airway/Jaw/Neck:  Airway Findings: Mouth Opening: Normal Tongue: Normal  General Airway Assessment: Adult, Average  Mallampati: II  TM Distance: Normal, at least 6 cm  Jaw/Neck Findings:  Neck ROM: Normal ROM      Dental:  Dental Findings: In tact   Chest/Lungs:  Chest/Lungs Findings: Normal Respiratory Rate, Clear to auscultation     Heart/Vascular:  Heart Findings: Rate: Normal  Rhythm: Regular Rhythm        Mental Status:  Mental Status Findings:  Alert and Oriented, Cooperative         Anesthesia Plan  Type of Anesthesia, risks & benefits discussed:  Anesthesia Type:  general  Patient's Preference:   Intra-op Monitoring Plan: standard ASA monitors  Intra-op Monitoring Plan Comments:   Post Op Pain Control Plan:   Post Op Pain Control Plan Comments:   Induction:   IV  Beta Blocker:  Patient is not currently on a Beta-Blocker (No further documentation required).       Informed Consent: Patient understands risks and agrees  with Anesthesia plan.  Questions answered. Anesthesia consent signed with patient.  ASA Score: 2     Day of Surgery Review of History & Physical:    H&P update referred to the surgeon.         Ready For Surgery From Anesthesia Perspective.

## 2017-03-08 NOTE — TRANSFER OF CARE
"Anesthesia Transfer of Care Note    Patient: Bri Roque    Procedure(s) Performed: Procedure(s) (LRB):  HYSTERECTOMY-SUPRA-CERVICAL (N/A)    Patient location: PACU    Anesthesia Type: general    Transport from OR: Transported from OR on 100% O2 by closed face mask with adequate spontaneous ventilation    Post pain: adequate analgesia    Post assessment: no apparent anesthetic complications and tolerated procedure well    Post vital signs: stable    Level of consciousness: awake and responds to stimulation    Nausea/Vomiting: no nausea/vomiting    Complications: none          Last vitals:   Visit Vitals    BP (!) 146/79 (BP Location: Right arm, Patient Position: Lying, BP Method: Automatic)    Pulse 82    Temp 37.8 °C (100 °F) (Oral)    Resp 10    Ht 5' 4.5" (1.638 m)    Wt 88.6 kg (195 lb 4 oz)    LMP 03/06/2017    SpO2 100%    Breastfeeding No    BMI 33 kg/m2     "

## 2017-03-08 NOTE — OP NOTE
DATE OF PROCEDURE:  03/08/2017    PREOPERATIVE DIAGNOSES:  Dysmenorrhea, menorrhagia with irregular menses,   fibroid uterus and grossly enlarged uterus.    POSTOPERATIVE DIAGNOSES:  Dysmenorrhea, menorrhagia with irregular menses,   fibroid uterus and grossly enlarged uterus.    PROCEDURE:  Total abdominal hysterectomy with bilateral salpingectomy.    SURGEON:  Talat Lima M.D.    ASSISTANT:  Landry Mark M.D.    ESTIMATED BLOOD LOSS:  Approximately 500 mL.    PATHOLOGY:  Uterus, cervix, bilateral fallopian tubes.    COMPLICATIONS:  None noted.    PROCEDURE IN DETAIL:  After informed consent was obtained, the patient was taken   to the Operating Room and administered general endotracheal anesthesia without   difficulty.  The patient was prepped and draped in normal sterile fashion in   dorsal supine position.  A Pfannenstiel skin incision was made with the scalpel   approximately 2 cm above the symphysis pubis and carried through to the   underlying layer of the rectus fascia.  The rectus fascia was scored in the   midline and the fascial incision extended bilaterally with cautery.  Superior   aspect of this incision was grasped with [facility] clamps, elevated and tented up   and muscles of the anterior abdominal wall were dissected off sharply and   bluntly.  Attention was then turned to the inferior aspect of this incision,   which the muscles of the anterior abdominal wall were dissected off in a similar   fashion.  Rectus muscles were  in midline.  Peritoneum identified and   the peritoneal incision extended superiorly and inferiorly with good   visualization of bladder.  Due to the gross enlargement of the uterus, the   uterus was then exteriorized through the abdomen.  The utero-ovarian ligament on   the right side was clamped, cauterized and transected with the EnSeal.  The   round ligament was clamped, cauterized and transected and the cardinal ligament   and uterine arteries were clamped,  cauterized and transected in a stepwise   fashion all the way to the level of the uterosacral ligaments.  Bladder flap was   dissected off the lower uterine segment and then the utero-ovarian ligament,   round ligament and cardinal ligament and uterine arteries were clamped,   cauterized and transected in a similar fashion to the right side all the way to   the level of the uterosacral ligament.  The cervical stump was amputated from   the vagina using cautery and the vaginal cuff was closed with a series of   figure-of-eight 0 Vicryl sutures.  The fallopian tubes were then removed from   the ovaries by using the EnSeal as well and the pelvis was then copiously   irrigated with sterile water and excellent hemostasis was observed.  All   instruments and packing were then removed from the abdomen and pelvis.  The   rectus muscles were reapproximated using 2-0 chromic in a running fashion.  The   fascia was reapproximated using #1 Vicryl in a running fashion and the skin was   closed with Insorb staples.  Sponge, lap, needle, and instrument counts were   correct x2.  There were no noted intraoperative complications.  The patient   tolerated the procedure well and was taken to Recovery awake and in stable   condition.      JIL  dd: 03/08/2017 09:44:42 (CST)  td: 03/08/2017 14:24:29 (CST)  Doc ID   #[id number]  Job ID #[id number]    CC:

## 2017-03-08 NOTE — IP AVS SNAPSHOT
[place] [address] [place] [address]  Phone: [phone]           Patient Discharge Instructions     Our goal is to set you up for success. This packet includes information on your condition, medications, and your home care. It will help you to care for yourself so you don't get sicker and need to go back to the hospital.     Please ask your nurse if you have any questions.        There are many details to remember when preparing to leave the hospital. Here is what you will need to do:    1. Take your medicine. If you are prescribed medications, review your Medication List in the following pages. You may have new medications to  at the pharmacy and others that you'll need to stop taking. Review the instructions for how and when to take your medications. Talk with your doctor or nurses if you are unsure of what to do.     2. Go to your follow-up appointments. Specific follow-up information is listed in the following pages. Your may be contacted by a transition nurse or clinical provider about future appointments. Be sure we have all of the phone numbers to reach you, if needed. Please contact your provider's office if you are unable to make an appointment.     3. Watch for warning signs. Your doctor or nurse will give you detailed warning signs to watch for and when to call for assistance. These instructions may also include educational information about your condition. If you experience any of warning signs to your health, call your doctor.               [facility]  Unless otherwise directed by your provider, please contact [facility], our nurse care line that is available for 24/7 assistance.     [phone] (toll-free)    Registered nurses in the [facility] provide clinical advisement, health education, appointment booking, and other advisory services.                    ** Verify the list of medication(s) below is accurate and up to date.  Carry this with you in case of emergency. If your medications have changed, please notify your healthcare provider.             Medication List      START taking these medications        Additional Info                      ibuprofen 800 MG tablet   Commonly known as:  ADVIL,MOTRIN   Quantity:  60 tablet   Refills:  1   Dose:  800 mg    Last time this was given:  800 mg on 3/10/2017 12:48 PM   Instructions:  Take 1 tablet (800 mg total) by mouth 3 (three) times daily.     Begin Date    AM    Noon    PM    Bedtime       oxycodone-acetaminophen 5-325 mg per tablet   Commonly known as:  PERCOCET   Quantity:  30 tablet   Refills:  0   Dose:  1 tablet    Last time this was given:  1 tablet on 3/10/2017 12:48 PM   Instructions:  Take 1 tablet by mouth every 4 (four) hours as needed.     Begin Date    AM    Noon    PM    Bedtime         ASK your doctor about these medications        Additional Info                      clotrimazole-betamethasone 1-0.05% cream   Commonly known as:  LOTRISONE   Quantity:  15 g   Refills:  1    Instructions:  Apply to affected area 2 times daily     Begin Date    AM    Noon    PM    Bedtime       IRON ORAL   Refills:  0   Dose:  1 tablet    Instructions:  Take 1 tablet by mouth 2 (two) times daily. 45 mg---last dose 3/5/17     Begin Date    AM    Noon    PM    Bedtime            Where to Get Your Medications      These medications were sent to [facility]/pharmacy #[id number] [place] [address] [address]     Phone:  [phone]     ibuprofen 800 MG tablet    oxycodone-acetaminophen 5-325 mg per tablet                  Please bring to all follow up appointments:    1. A copy of your discharge instructions.  2. All medicines you are currently taking in their original bottles.  3. Identification and insurance card.    Please arrive 15 minutes ahead of scheduled appointment time.    Please call 24 hours in advance if you must reschedule your  appointment and/or time.        Your Scheduled Appointments     Mar 14, 2017  9:30 AM CDT   Routine Prenatal Visit with Talat Lima MD   [facility] - OB/ GYN ([facility])    [address] [facility] [address]  [address] [place] [id number] [phone]              Follow-up Information     Follow up with Talat Lima MD In 1 week.    Specialties:  Obstetrics, Obstetrics and Gynecology    Why:  dressing removal    Contact information:    [address] [facility]  [address]  [facility] [id number] [phone]          Discharge Instructions     Future Orders    Type And Screen Preop     Call MD for:  difficulty breathing or increased cough     Call MD for:  increased confusion or weakness     Call MD for:  persistent dizziness, light-headedness, or visual disturbances     Call MD for:  persistent nausea and vomiting or diarrhea     Call MD for:  redness, tenderness, or signs of infection (pain, swelling, redness, odor or green/yellow discharge around incision site)     Call MD for:  severe persistent headache     Call MD for:  severe uncontrolled pain     Call MD for:  temperature >100.4     Diet general     Questions:    Total calories:      Fat restriction, if any:      Protein restriction, if any:      Na restriction, if any:      Fluid restriction:      Additional restrictions:      Leave dressing on - Keep it clean, dry, and intact until clinic visit     Lifting restrictions     Weight bearing restrictions (specify)         Discharge Instructions       General Discharge Instructions:    Your bandage is water proof.  Shower like you normally do.  If the bandage starts to come off and water can get down to the incision then remove the bandage and CALL YOUR DOCTOR IMMEDIATELY!!!!!  Keep the incision clean and dry with warm soapy water and pat dry.      May follow a regular diet, unless otherwise discussed with physician.  Drink plenty fluids.  Activity as tolerated.  No lifting or heavy exercise.  No sex, douching or  "tampons.  May return to work/school as discussed with physician.  Follow MD specific instructions, follow up as instructed.    Report to MD:    Pain that is worsening   Nausea and vomiting  Temp >100.4  Heavy vaginal bleeding  Incision comes open or starts draining nasty, foul smelling discharge                      Primary Diagnosis     Your primary diagnosis was:  Uterine Fibroid      Admission Information     Date & Time Provider Department CSN    3/8/2017  5:30 AM Talat Lima MD [facility] [id number]      Care Providers     Provider Role Specialty Primary office phone    Talat Lima MD Attending Provider Obstetrics [phone]    Talat Lima MD Surgeon  Obstetrics [phone]      Your Vitals Were     BP Pulse Temp Resp Height Weight    119/69 83 98.4 °F (36.9 °C) 17 5' 4.5" (1.638 m) 88.6 kg (195 lb 4 oz)    Last Period SpO2 BMI          03/06/2017 99% 33 kg/m2        Recent Lab Values        8/10/2015                          10:00 AM           A1C 6.0                       Pending Labs     Order Current Status    Specimen to Pathology - Surgery In process      Allergies as of 3/10/2017     No Known Allergies      Advance Directives     An advance directive is a document which, in the event you are no longer able to make decisions for yourself, tells your healthcare team what kind of treatment you do or do not want to receive, or who you would like to make those decisions for you.  If you do not currently have an advance directive, [facility] encourages you to create one.  For more information call:  [phone]), [phone] ([phone]),  or log on to [email or web address].        Language Assistance Services     ATTENTION: Language assistance services are available, free of charge. Please call [phone].      ATENCIÓN: Si jerrellla glen, tiene a garcia disposición servicios gratuitos de asistencia lingüística. Llame al [phone].     CHÚ Ý: N?u b?n " nói Ti?ng Vi?t, có các d?ch v? h? tr? ngôn ng? mi?n phí dành cho b?n. G?i s? [phone].        [facility] Sign-Up     Activating your [facility] account is as easy as 1-2-3!     1) Visit [email or web address], select Sign Up Now, enter this activation code and your date of birth, then select Next.  [email or web address]  Expires: 3/19/2017  9:13 AM      2) Create a username and password to use when you visit [facility] in the future and select a security question in case you lose your password and select Next.    3) Enter your e-mail address and click Sign Up!    Additional Information  If you have questions, please e-mail [email or web address] or call [phone] to talk to our [facility] staff. Remember, [facility] is NOT to be used for urgent needs. For medical emergencies, dial 911.          [facility] complies with applicable Federal civil rights laws and does not discriminate on the basis of race, color, national origin, age, disability, or sex.

## 2017-03-09 PROCEDURE — 11000001 HC ACUTE MED/SURG PRIVATE ROOM

## 2017-03-09 PROCEDURE — 94761 N-INVAS EAR/PLS OXIMETRY MLT: CPT

## 2017-03-09 PROCEDURE — 25000003 PHARM REV CODE 250: Performed by: OBSTETRICS & GYNECOLOGY

## 2017-03-09 PROCEDURE — 63600175 PHARM REV CODE 636 W HCPCS: Performed by: OBSTETRICS & GYNECOLOGY

## 2017-03-09 RX ADMIN — OXYCODONE HYDROCHLORIDE AND ACETAMINOPHEN 1 TABLET: 5; 325 TABLET ORAL at 08:03

## 2017-03-09 RX ADMIN — BISACODYL 10 MG: 10 SUPPOSITORY RECTAL at 07:03

## 2017-03-09 RX ADMIN — OXYCODONE HYDROCHLORIDE AND ACETAMINOPHEN 1 TABLET: 5; 325 TABLET ORAL at 03:03

## 2017-03-09 RX ADMIN — KETOROLAC TROMETHAMINE 30 MG: 30 INJECTION, SOLUTION INTRAMUSCULAR at 12:03

## 2017-03-09 RX ADMIN — IBUPROFEN 800 MG: 400 TABLET, FILM COATED ORAL at 09:03

## 2017-03-09 RX ADMIN — DOCUSATE SODIUM AND SENNOSIDES 1 TABLET: 8.6; 5 TABLET, FILM COATED ORAL at 08:03

## 2017-03-09 RX ADMIN — KETOROLAC TROMETHAMINE 30 MG: 30 INJECTION, SOLUTION INTRAMUSCULAR at 05:03

## 2017-03-09 RX ADMIN — ONDANSETRON 8 MG: 8 TABLET, ORALLY DISINTEGRATING ORAL at 05:03

## 2017-03-09 RX ADMIN — DOCUSATE SODIUM AND SENNOSIDES 1 TABLET: 8.6; 5 TABLET, FILM COATED ORAL at 09:03

## 2017-03-09 RX ADMIN — IBUPROFEN 800 MG: 400 TABLET, FILM COATED ORAL at 02:03

## 2017-03-09 NOTE — PLAN OF CARE
Problem: Hysterectomy (Adult)  Goal: Signs and Symptoms of Listed Potential Problems Will be Absent, Minimized or Managed (Hysterectomy)  Signs and symptoms of listed potential problems will be absent, minimized or managed by discharge/transition of care (reference Hysterectomy (Adult) CPG).   Outcome: Ongoing (interventions implemented as appropriate)    03/09/17 1442   Hysterectomy   Problems Assessed (Hysterectomy) all   Problems Present (Hysterectomy) pain         Comments:   Pain meds given po prn

## 2017-03-10 VITALS
SYSTOLIC BLOOD PRESSURE: 119 MMHG | HEART RATE: 83 BPM | BODY MASS INDEX: 32.53 KG/M2 | DIASTOLIC BLOOD PRESSURE: 69 MMHG | TEMPERATURE: 98 F | WEIGHT: 195.25 LBS | HEIGHT: 65 IN | RESPIRATION RATE: 17 BRPM | OXYGEN SATURATION: 99 %

## 2017-03-10 PROCEDURE — 25000003 PHARM REV CODE 250: Performed by: OBSTETRICS & GYNECOLOGY

## 2017-03-10 RX ORDER — IBUPROFEN 800 MG/1
800 TABLET ORAL 3 TIMES DAILY
Qty: 60 TABLET | Refills: 1 | Status: SHIPPED | OUTPATIENT
Start: 2017-03-10 | End: 2020-09-18

## 2017-03-10 RX ORDER — OXYCODONE AND ACETAMINOPHEN 5; 325 MG/1; MG/1
1 TABLET ORAL EVERY 4 HOURS PRN
Qty: 30 TABLET | Refills: 0 | Status: SHIPPED | OUTPATIENT
Start: 2017-03-10 | End: 2020-09-18

## 2017-03-10 RX ADMIN — IBUPROFEN 800 MG: 400 TABLET, FILM COATED ORAL at 06:03

## 2017-03-10 RX ADMIN — OXYCODONE HYDROCHLORIDE AND ACETAMINOPHEN 1 TABLET: 5; 325 TABLET ORAL at 12:03

## 2017-03-10 RX ADMIN — IBUPROFEN 800 MG: 400 TABLET, FILM COATED ORAL at 12:03

## 2017-03-10 RX ADMIN — ONDANSETRON 8 MG: 8 TABLET, ORALLY DISINTEGRATING ORAL at 06:03

## 2017-03-10 NOTE — PLAN OF CARE
Problem: Patient Care Overview  Goal: Plan of Care Review  Outcome: Outcome(s) achieved Date Met:  03/10/17  VSS, BBS clear, using IS at bedside and using independently. BS +. Passing flatus, voiding and ambulating. tolerating regular diet well. Pain being managed with Motrin and percocet as needed. Patient anticipates discharge to home 3-10-17

## 2017-03-10 NOTE — DISCHARGE INSTRUCTIONS
General Discharge Instructions:    Your bandage is water proof.  Shower like you normally do.  If the bandage starts to come off and water can get down to the incision then remove the bandage and CALL YOUR DOCTOR IMMEDIATELY!!!!!  Keep the incision clean and dry with warm soapy water and pat dry.      May follow a regular diet, unless otherwise discussed with physician.  Drink plenty fluids.  Activity as tolerated.  No lifting or heavy exercise.  No sex, douching or tampons.  May return to work/school as discussed with physician.  Follow MD specific instructions, follow up as instructed.    Report to MD:    Pain that is worsening   Nausea and vomiting  Temp >100.4  Heavy vaginal bleeding  Incision comes open or starts draining nasty, foul smelling discharge

## 2017-03-10 NOTE — DISCHARGE SUMMARY
[facility]  Obstetrics & Gynecology  Discharge Summary    Patient Name: Bri Roque  MRN: [id number]  Admission Date: 3/8/2017  Hospital Length of Stay: 2 days  Discharge Date and Time:  03/10/2017 1:05 PM  Attending Physician: Talat Lima MD   Discharging Provider: Talat Lima MD  Primary Care Provider: Umm Mora MD    HPI: Pt had scheduled WALE with bilateral salpinectomy    Hospital Course: Pt admitted for scheduled surgery which was performed without complication.  Postoperatively, the pt did well.  She was ambulating, voiding spontaneously, tolerating oral pain medications and liquids.       Procedure(s) (LRB):  HYSTERECTOMY-SUPRA-CERVICAL (N/A) with bilateral salpingectomy    Consults:     Significant Diagnostic Studies:   Specimen     None          Pending Diagnostic Studies:     None        Final Active Diagnoses:    Diagnosis Date Noted POA    PRINCIPAL PROBLEM:  Uterine leiomyoma [D25.9] 06/08/2016 Yes    Enlarged uterus [N85.2] 02/22/2017 Yes    Dysmenorrhea [N94.6] 02/22/2017 Yes    Menorrhagia with irregular cycle [N92.1] 02/22/2017 Yes    Anemia [D64.9] 02/09/2017 Yes      Problems Resolved During this Admission:    Diagnosis Date Noted Date Resolved POA        Discharged Condition: good    Disposition: Home or Self Care    Follow Up:  Follow-up Information     Follow up with Talat Lima MD In 1 week.    Specialties:  Obstetrics, Obstetrics and Gynecology    Why:  dressing removal    Contact information:    [address] [place], [place] [address] [phone]          Patient Instructions:     Diet general     Lifting restrictions     Weight bearing restrictions (specify)     Call MD for:  temperature >100.4     Call MD for:  persistent nausea and vomiting or diarrhea     Call MD for:  severe uncontrolled pain     Call MD for:  redness, tenderness, or signs of infection (pain, swelling, redness, odor or green/yellow discharge around incision site)      Call MD for:  difficulty breathing or increased cough     Call MD for:  severe persistent headache     Call MD for:  persistent dizziness, light-headedness, or visual disturbances     Call MD for:  increased confusion or weakness     Leave dressing on - Keep it clean, dry, and intact until clinic visit     Type And Screen Preop   Standing Status: Future  Standing Exp. Date: 05/05/18       Medications:  Reconciled Home Medications:   Current Discharge Medication List      START taking these medications    Details   ibuprofen (ADVIL,MOTRIN) 800 MG tablet Take 1 tablet (800 mg total) by mouth 3 (three) times daily.  Qty: 60 tablet, Refills: 1      oxycodone-acetaminophen (PERCOCET) 5-325 mg per tablet Take 1 tablet by mouth every 4 (four) hours as needed.  Qty: 30 tablet, Refills: 0         STOP taking these medications       clotrimazole-betamethasone 1-0.05% (LOTRISONE) cream Comments:   Reason for Stopping:         FERROUS FUMARATE (IRON ORAL) Comments:   Reason for Stopping:               Talat Lima MD  Obstetrics & Gynecology  [facility]

## 2017-03-10 NOTE — PROGRESS NOTES
"Bri Roque is a 47 y.o. female patient.    Active Hospital Problems    Diagnosis  POA    *Uterine leiomyoma [D25.9]  Yes    Enlarged uterus [N85.2]  Yes    Dysmenorrhea [N94.6]  Yes    Menorrhagia with irregular cycle [N92.1]  Yes    Anemia [D64.9]  Yes      Resolved Hospital Problems    Diagnosis Date Resolved POA   No resolved problems to display.     Temp: 98.4 °F (36.9 °C) (03/10/17 1200)  Pulse: 83 (03/10/17 1200)  Resp: 17 (03/10/17 1200)  BP: 119/69 (03/10/17 1200)  SpO2: 99 % (03/09/17 0828)  Weight: 88.6 kg (195 lb 4 oz) (03/07/17 1430)  Height: 5' 4.5" (163.8 cm) (03/07/17 1430)    Subjective:  Symptoms:  Stable.  No shortness of breath, malaise, cough, chest pain, weakness, headache, chest pressure, anorexia, diarrhea or anxiety.  (Pt denies any c/o today; ambulating well).    Diet:  Adequate intake.  No nausea or vomiting.    Activity level: Returning to normal.    Pain:  She complains of pain that is mild.  She reports pain is improving.  Pain is requiring pain medication.      Objective:  General Appearance:  Comfortable, well-appearing, in no acute distress and not in pain.    Vital signs: (most recent): Blood pressure 119/69, pulse 83, temperature 98.4 °F (36.9 °C), resp. rate 17, height 5' 4.5" (1.638 m), weight 88.6 kg (195 lb 4 oz), last menstrual period 03/06/2017, SpO2 99 %, not currently breastfeeding.  Vital signs are normal.  No fever.    Output: Producing urine and producing stool.    Lungs:  Normal effort.    Heart: Normal rate.    Chest: Symmetric chest wall expansion.   Extremities: Normal range of motion.  There is no venous stasis, deformity or effusion.    Neurological: Patient is alert and oriented to person, place and time.    Abdomen: Abdomen is soft and non-distended.  (Wound:  aquasil dressing in place)There is no abdominal tenderness.       Assessment:    Condition: In stable condition.  Improving.   (POD #2 s/p WALE).     Plan:   Discharge home.  Encourage " ambulation.  Regular diet.        Talat Lima MD  3/10/2017

## 2017-03-14 ENCOUNTER — POSTPARTUM VISIT (OUTPATIENT)
Dept: OBSTETRICS AND GYNECOLOGY | Facility: CLINIC | Age: 47
End: 2017-03-14
Payer: MEDICAID

## 2017-03-14 VITALS
BODY MASS INDEX: 33.12 KG/M2 | DIASTOLIC BLOOD PRESSURE: 70 MMHG | WEIGHT: 194 LBS | HEIGHT: 64 IN | SYSTOLIC BLOOD PRESSURE: 120 MMHG

## 2017-03-14 DIAGNOSIS — Z09 POSTOPERATIVE EXAMINATION: Primary | ICD-10-CM

## 2017-03-14 PROCEDURE — 99999 PR PBB SHADOW E&M-EST. PATIENT-LVL III: CPT | Mod: PBBFAC,,, | Performed by: OBSTETRICS & GYNECOLOGY

## 2017-03-14 PROCEDURE — 99024 POSTOP FOLLOW-UP VISIT: CPT | Mod: ,,, | Performed by: OBSTETRICS & GYNECOLOGY

## 2017-03-14 PROCEDURE — 99213 OFFICE O/P EST LOW 20 MIN: CPT | Mod: PBBFAC | Performed by: OBSTETRICS & GYNECOLOGY

## 2017-03-14 NOTE — PROGRESS NOTES
CC:  Postop check    HPI:  Pt had WALE on 3/8/17 without complication.  Pathology is currently pending.  Pt denies any c/o; pain controlled with meds.  Pt denies any bowel or bladder problems.  Pt states she actually feels improved from prior to surgery.    Exam:  Abd:  Soft NTND  Wound:  aquasil dressing removed, incision appears to be healing well.    Assessment  Postop wound check    Plan  Continue with postop limitations  F/u 5 wks for full clearance.

## 2017-03-14 NOTE — MR AVS SNAPSHOT
"    [facility]  [address]  [address]  [place] [address]  Phone: [phone]                  Bri Roque   3/14/2017 9:30 AM   Postpartum Visit    Description:  Female : 1970   Provider:  Talat Lima MD   Department:  [facility]           Reason for Visit     Post-op Evaluation                To Do List           Future Appointments        Provider Department Dept Phone    3/14/2017 9:30 AM Talat Lima MD [facility]/ [phone]      Goals (5 Years of Data)     None      [facility]     [facility] Nurse Care Line -  Assistance  Registered nurses in the [facility] Center provide clinical advisement, health education, appointment booking, and other advisory services.  Call for this free service at [phone].             Medications                Verify that the below list of medications is an accurate representation of the medications you are currently taking.  If none reported, the list may be blank. If incorrect, please contact your healthcare provider. Carry this list with you in case of emergency.           Current Medications     ibuprofen (ADVIL,MOTRIN) 800 MG tablet Take 1 tablet (800 mg total) by mouth 3 (three) times daily.    oxycodone-acetaminophen (PERCOCET) 5-325 mg per tablet Take 1 tablet by mouth every 4 (four) hours as needed.           Clinical Reference Information           Your Vitals Were     BP Height Weight Last Period BMI    120/70 5' 4" (1.626 m) 88 kg (194 lb) 2017 33.3 kg/m2      Allergies as of 3/14/2017     No Known Allergies      Immunizations Administered on Date of Encounter - 3/14/2017     None      [facility] Sign-Up     Activating your [facility] account is as easy as 1-2-3!     1) Visit [email or web address], select Sign Up Now, enter this activation code and your date of birth, then select Next.  [email or web address]  Expires: 3/19/2017 10:13 AM      2) Create a username and password to use when " you visit [facility] in the future and select a security question in case you lose your password and select Next.    3) Enter your e-mail address and click Sign Up!    Additional Information  If you have questions, please e-mail [email or web address] or call [phone] to talk to our [facility] staff. Remember, [facility] is NOT to be used for urgent needs. For medical emergencies, dial 911.         Language Assistance Services     ATTENTION: Language assistance services are available, free of charge. Please call [phone].      ATENCIÓN: Si habla español, tiene a garcia disposición servicios gratuitos de asistencia lingüística. Llame al [phone].     CHÚ Ý: N?u b?n nói Ti?ng Vi?t, có các d?ch v? h? tr? ngôn ng? mi?n phí dành cho b?n. G?i s? [phone].         [facility] - [facility]/ GYN complies with applicable Federal civil rights laws and does not discriminate on the basis of race, color, national origin, age, disability, or sex.

## 2017-04-18 ENCOUNTER — OFFICE VISIT (OUTPATIENT)
Dept: OBSTETRICS AND GYNECOLOGY | Facility: CLINIC | Age: 47
End: 2017-04-18
Payer: MEDICAID

## 2017-04-18 VITALS
SYSTOLIC BLOOD PRESSURE: 124 MMHG | WEIGHT: 191 LBS | DIASTOLIC BLOOD PRESSURE: 62 MMHG | HEIGHT: 64 IN | BODY MASS INDEX: 32.61 KG/M2

## 2017-04-18 DIAGNOSIS — Z09 POSTOP CHECK: Primary | ICD-10-CM

## 2017-04-18 DIAGNOSIS — Z90.710 S/P TAH (TOTAL ABDOMINAL HYSTERECTOMY): ICD-10-CM

## 2017-04-18 PROBLEM — N94.6 DYSMENORRHEA: Status: RESOLVED | Noted: 2017-02-22 | Resolved: 2017-04-18

## 2017-04-18 PROBLEM — N85.2 ENLARGED UTERUS: Status: RESOLVED | Noted: 2017-02-22 | Resolved: 2017-04-18

## 2017-04-18 PROBLEM — N92.1 MENORRHAGIA WITH IRREGULAR CYCLE: Status: RESOLVED | Noted: 2017-02-22 | Resolved: 2017-04-18

## 2017-04-18 PROCEDURE — 99024 POSTOP FOLLOW-UP VISIT: CPT | Mod: ,,, | Performed by: OBSTETRICS & GYNECOLOGY

## 2017-04-18 PROCEDURE — 99212 OFFICE O/P EST SF 10 MIN: CPT | Mod: PBBFAC | Performed by: OBSTETRICS & GYNECOLOGY

## 2017-04-18 PROCEDURE — 99999 PR PBB SHADOW E&M-EST. PATIENT-LVL II: CPT | Mod: PBBFAC,,, | Performed by: OBSTETRICS & GYNECOLOGY

## 2017-04-18 NOTE — MR AVS SNAPSHOT
"    [facility] - OB/ GYN  [address]  [place] [address]  Phone: [phone]                  Bri Roque   2017 10:00 AM   Office Visit    Description:  Female : 1970   Provider:  Talat Lmia MD   Department:  [facility] - OB/ GYN           Reason for Visit     Post-op Evaluation                To Do List           Goals (5 Years of Data)     None      [facility]     [facility] Nurse Care Line -  Assistance  Unless otherwise directed by your provider, please contact [facility], our nurse care line that is available for  assistance.     Registered nurses in the [facility] provide: appointment scheduling, clinical advisement, health education, and other advisory services.  Call: [phone] (toll free)               Medications                Verify that the below list of medications is an accurate representation of the medications you are currently taking.  If none reported, the list may be blank. If incorrect, please contact your healthcare provider. Carry this list with you in case of emergency.           Current Medications     ibuprofen (ADVIL,MOTRIN) 800 MG tablet Take 1 tablet (800 mg total) by mouth 3 (three) times daily.    oxycodone-acetaminophen (PERCOCET) 5-325 mg per tablet Take 1 tablet by mouth every 4 (four) hours as needed.           Clinical Reference Information           Your Vitals Were     BP Height Weight Last Period BMI    124/62 5' 4" (1.626 m) 86.6 kg (191 lb) 2017 32.79 kg/m2      Blood Pressure          Most Recent Value    BP  124/62      Allergies as of 2017     No Known Allergies      Immunizations Administered on Date of Encounter - 2017     None      [facility] Sign-Up     Activating your [facility] account is as easy as 1-2-3!     1) Visit [email or web address], select Sign Up Now, enter this activation code and your date of birth, then select Next.  [email or web address]  Expires: 2017 10:44 AM  "     2) Create a username and password to use when you visit [facility] in the future and select a security question in case you lose your password and select Next.    3) Enter your e-mail address and click Sign Up!    Additional Information  If you have questions, please e-mail [email or web address] or call [phone] to talk to our [facility] staff. Remember, [facility] is NOT to be used for urgent needs. For medical emergencies, dial 911.         Language Assistance Services     ATTENTION: Language assistance services are available, free of charge. Please call [phone].      ATENCIÓN: Si habla español, tiene a garcia disposición servicios gratuitos de asistencia lingüística. Llame al [phone].     CHÚ Ý: N?u b?n nói Ti?ng Vi?t, có các d?ch v? h? tr? ngôn ng? mi?n phí dành cho b?n. G?i s? [phone].         [facility] - OB/ GYN complies with applicable Federal civil rights laws and does not discriminate on the basis of race, color, national origin, age, disability, or sex.

## 2017-04-18 NOTE — PROGRESS NOTES
CC:  Postop exam    HPI:  Pt had WALE w/ bilateral salpingectomy on 3/7/17 for benign indications.  Pt doing well but c/o some back pain that is upper lumbar in midline which is relieved with icy/hot and NSAID's.  Pt denies any urinary or bowel probs.  Pt is not currently sexually active for several years.    Path:  PreOperative Diagnosis  Dysmenorrhea, enlarged uterus, pelvic pain, menorrhagia with irregualr cycle, leiomyoma & anemia  PostOperative Diagnosis  Same  SPECIMEN  1) Uterus, Cervix, Bilateral Tubes  FINAL PATHOLOGIC DIAGNOSIS  Uterus and bilateral fallopian tubes, hysterectomy and bilateral salpingectomy:  - Benign leiomyomata, 7.8-1 cm in greatest dimension macroscopically.  - Secretory endometrium.  - Benign fallopian tubes with disruption and dilatation consistent with previous tubal ligation.  - Benign endocervix and ectocervix with no significant histologic abnormality.  - Negative for hyperplasia, atypia, dysplasia, and malignancy.    Exam:  Abd: soft NTND  Wound:  Well healed  Pelvic:  Pink vag mucosa; cuff well healed, no pain with bimanual    Assessment  Postop check    Plan  Discussed pt may resume full activity without limitations, stressed need for exercise

## 2018-10-01 ENCOUNTER — HOSPITAL ENCOUNTER (EMERGENCY)
Facility: HOSPITAL | Age: 48
Discharge: HOME | End: 2018-10-01
Attending: EMERGENCY MEDICINE
Payer: MEDICAID

## 2018-10-01 VITALS
RESPIRATION RATE: 18 BRPM | HEART RATE: 66 BPM | WEIGHT: 190 LBS | SYSTOLIC BLOOD PRESSURE: 136 MMHG | HEIGHT: 65 IN | OXYGEN SATURATION: 98 % | BODY MASS INDEX: 31.65 KG/M2 | TEMPERATURE: 98 F | DIASTOLIC BLOOD PRESSURE: 107 MMHG
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
## 2018-10-01 PROCEDURE — 99281 EMR DPT VST MAYX REQ PHY/QHP: CPT

## 2018-10-01 RX ORDER — SULFAMETHOXAZOLE AND TRIMETHOPRIM 800; 160 MG/1; MG/1
1 TABLET ORAL 2 TIMES DAILY
Qty: 14 TABLET | Refills: 0 | Status: SHIPPED | OUTPATIENT
Start: 2018-10-01 | End: 2018-10-08

## 2018-10-01 NOTE — ED PROVIDER NOTES
Encounter Date: 10/1/2018       History     Chief Complaint   Patient presents with    Insect Bite     pt reports insect bite to right top of foot since Saturday. pt c/o pain, itching and swelling pt reports she took motrin yesterday evening for pain and has had some relief. local redness noted to area. pt denies pain at this time     Chief complaint:  Insect sting  48-year-old says she was stung by an unknown insect on the dorsum of her right foot 2 days ago.  She has noted redness and swelling to the area.  Patient has been soaking the foot in Epsom salt and alcohol with some improvement.  She denies difficulty swallowing or breathing.  She has been taking ibuprofen with improvement but no Benadryl.  No fever.  She denies difficulty walking.      The history is provided by the patient.     Review of patient's allergies indicates:  No Known Allergies  Past Medical History:   Diagnosis Date    Anemia     No blood products      Past Surgical History:   Procedure Laterality Date    HYSTERECTOMY  2017    Dr Lima    HYSTERECTOMY-SUPRA-CERVICAL N/A 3/8/2017    [facility]    TUBAL LIGATION      VAGINAL DELIVERY       x 2     Family History   Problem Relation Age of Onset    Cancer Mother         cervical    Diabetes Mother     Heart disease Father     Diabetes Father     Stroke Maternal Grandmother     Diabetes Maternal Grandmother     Heart disease Maternal Grandfather     Heart disease Paternal Grandfather     Diabetes Brother     Diabetes Brother     Diabetes Brother      Social History     Tobacco Use    Smoking status: Never Smoker   Substance Use Topics    Alcohol use: No    Drug use: No     Review of Systems   Constitutional: Negative for fever.   Musculoskeletal: Positive for joint swelling. Negative for gait problem.   Skin: Positive for color change.   Neurological: Negative for numbness.       Physical Exam     Initial Vitals [10/01/18 0920]   BP Pulse  Resp Temp SpO2   (S) (!) 136/107 66 18 97.7 °F (36.5 °C) 98 %      MAP       --         Physical Exam    Nursing note and vitals reviewed.  Constitutional: No distress.   HENT:   Head: Normocephalic.   Cardiovascular: Intact distal pulses.   Pulmonary/Chest: No respiratory distress.   Neurological: She is alert and oriented to person, place, and time. She has normal strength. No sensory deficit.   Skin: Skin is warm and dry. There is erythema (Dorsum of the right and lateral foot, small pustule, see picture).             ED Course   Procedures  Labs Reviewed - No data to display       Imaging Results    None          Medical Decision Making:   Initial Assessment:   48-year-old presents with an area of redness to the dorsum of her right foot associated with a small pustule reportedly from an insect sting.  On exam patient's of have small pustule.  She has also got erythema and mild edema to the foot.  ED Management:  No evidence of abscess.  Patient appears to have an infected insect sting.  She will be treated with Bactrim.  She is advised to continue elevating the foot.  She should also take Benadryl as well as ibuprofen.  Patient was advised not to drive while taking the Benadryl.                      Clinical Impression:   The encounter diagnosis was Bite or sting by insect with infection.                             Sejal Henley MD  10/01/18 5318

## 2018-10-01 NOTE — DISCHARGE INSTRUCTIONS
Continue to elevate as much as possible.  Take 1-2 Benadryl every 4-6 hours until redness has resolved.

## 2018-10-01 NOTE — ED NOTES
Presented to ed with a insect bite to the top of the rt. Foot.  Redness and swelling has gone down some since yesterday.  Pt. Reported she was up all night with the itching and discomfort for the foot.   Denies pain at this time.

## 2018-10-01 NOTE — ED NOTES
Pt. To elimate soda from her diet and no added salt to her food for her elevated blood pressure. She will follow up with her pcp as an out pt. For furthure needs.  Pt. Is not on blood pressure medication.

## 2019-03-12 ENCOUNTER — OFFICE VISIT (OUTPATIENT)
Dept: OBSTETRICS AND GYNECOLOGY | Facility: CLINIC | Age: 49
End: 2019-03-12
Payer: MEDICAID

## 2019-03-12 VITALS
WEIGHT: 203.94 LBS | SYSTOLIC BLOOD PRESSURE: 130 MMHG | DIASTOLIC BLOOD PRESSURE: 96 MMHG | HEIGHT: 65 IN | BODY MASS INDEX: 33.98 KG/M2

## 2019-03-12 DIAGNOSIS — Z90.710 STATUS POST HYSTERECTOMY: ICD-10-CM

## 2019-03-12 DIAGNOSIS — Z00.00 ANNUAL PHYSICAL EXAM: Primary | ICD-10-CM

## 2019-03-12 DIAGNOSIS — N95.1 MENOPAUSAL STATE: ICD-10-CM

## 2019-03-12 DIAGNOSIS — Z01.419 ENCOUNTER FOR GYNECOLOGICAL EXAMINATION WITHOUT ABNORMAL FINDING: ICD-10-CM

## 2019-03-12 PROCEDURE — 99396 PR PREVENTIVE VISIT,EST,40-64: ICD-10-PCS | Mod: S$PBB,,, | Performed by: OBSTETRICS & GYNECOLOGY

## 2019-03-12 PROCEDURE — 99396 PREV VISIT EST AGE 40-64: CPT | Mod: S$PBB,,, | Performed by: OBSTETRICS & GYNECOLOGY

## 2019-03-12 PROCEDURE — 99999 PR PBB SHADOW E&M-EST. PATIENT-LVL III: CPT | Mod: PBBFAC,,, | Performed by: OBSTETRICS & GYNECOLOGY

## 2019-03-12 PROCEDURE — 99213 OFFICE O/P EST LOW 20 MIN: CPT | Mod: PBBFAC | Performed by: OBSTETRICS & GYNECOLOGY

## 2019-03-12 PROCEDURE — 99999 PR PBB SHADOW E&M-EST. PATIENT-LVL III: ICD-10-PCS | Mod: PBBFAC,,, | Performed by: OBSTETRICS & GYNECOLOGY

## 2019-03-12 RX ORDER — LATANOPROST 50 UG/ML
SOLUTION/ DROPS OPHTHALMIC
COMMUNITY
Start: 2018-12-16

## 2019-03-12 NOTE — PROGRESS NOTES
Subjective:      Chief Complaint:    Chief Complaint   Patient presents with    Well Woman       Menstrual History:    OB History      Para Term  AB Living    2 2 2     4    SAB TAB Ectopic Multiple Live Births            2          Menarche age: 13     Patient's last menstrual period was 2017.                Objective:      PRESENT ILLNESS, PHYSICAL EXAMINATION    HISTORY OF PRESENT ILLNESS:  The patient is 49 years of age.   2, para 2.    Pap smear in 2016, negative.     MEDICAL:  Fibroids.      SURGERY:  Hysterectomy total with bilateral salpingo-oophorectomy and tubal   ligation because of fibroids.      The patient is not on any hormone replacement therapy, has no problem or   complaints.  No problem with menopausal symptoms.    PHYSICAL EXAMINATION:  VITAL SIGNS:  Blood pressure 130/96, weight 203.  BREASTS:  No lumps, mass, discharge, skin changes, retraction, nipple changes.    Axilla negative.  PELVIC:  External normal.  Vulva normal.  Bartholin, urethra and Kerr glands   are negative.  Vagina is clear, good rugal pattern.  Cervix, uterus, and adnexa   absent.  Good apical support.  No pain elicited on exam.  No mass.  RECTAL:  Negative.    IMPRESSION:  Essentially normal postmenopausal exam.    PLAN:  We will do a mammogram.    RECOMMENDATION:  Multivitamins, calcium, vitamin D, and increased activity, and   we will see the patient back within a year.      JIV/IN  dd: 2019 14:57:16 (CDT)  td: 2019 05:03:03 (CDT)  Doc ID   #[id number]  Job ID #[id number]    CC:       History of Present Illness AND  Examination detailed DICTATE:        Physical Exam   Constitutional: She is oriented to person, place, and time. She appears well-developed and well-nourished. No distress.   HENT:   Head: Normocep.  Eyes: Pupils are equal, round, and reactive to light.   Neck: Neck supple..   Cardiovascular: Normal rate, regular rhythm and normal heart sounds. No murmur  heard.  Pulmonary/Chest: Effort normal and breath sounds normal. No respiratory distress. She has no wheezes. She has no rales. She exhibits no tenderness.   Abdominal: Bowel sounds are normal. She exhibits no distension and no mass. There is no tenderness. There is no rebound and no guarding.   Musculoskeletal: Normal range of motion.   Lymphadenopathy:        Right: No inguinal adenopathy present.        Left: No inguinal adenopathy present.   Neurological: She is alert and oriented.  Skin: Skin is warm. No rash noted.        Review of Systems  Review of Systems   Normal ROS:   Constitutional: Negative for fever, chills, activity change fatigue and unexpected weight change.   HENT: Negative for nosebleeds, congestion.  Eyes: Negative for visual disturbance.   Respiratory: Negative for shortness of breath and wheezing.    Cardiovascular: Negative for chest pain, palpitations.   Gastrointestinal: Negative for abdominal pain, diarrhea, constipation, blood in stool and abdominal distention.   Musculoskeletal: Negative for back pain.   Allergic/Immunologic: Negative .   Neurological: Negati.   Hematological: Negati   Psychiatric/Behavioral: Negative     Assessment:      Diagnosis:ANNUAL  EXAM   GYN   EXAM  MENOPAUSAL       Plan:      Return in 12  months

## 2019-03-12 NOTE — PATIENT INSTRUCTIONS

## 2019-07-16 ENCOUNTER — HOSPITAL ENCOUNTER (EMERGENCY)
Facility: HOSPITAL | Age: 49
Discharge: HOME | End: 2019-07-16
Attending: EMERGENCY MEDICINE
Payer: MEDICAID

## 2019-07-16 VITALS
TEMPERATURE: 98 F | SYSTOLIC BLOOD PRESSURE: 138 MMHG | DIASTOLIC BLOOD PRESSURE: 82 MMHG | RESPIRATION RATE: 16 BRPM | BODY MASS INDEX: 33.12 KG/M2 | WEIGHT: 199 LBS | HEART RATE: 71 BPM | OXYGEN SATURATION: 99 %

## 2019-07-16 DIAGNOSIS — J06.9 UPPER RESPIRATORY TRACT INFECTION, UNSPECIFIED TYPE: Primary | ICD-10-CM

## 2019-07-16 PROCEDURE — 96372 THER/PROPH/DIAG INJ SC/IM: CPT | Mod: ER

## 2019-07-16 PROCEDURE — 63600175 PHARM REV CODE 636 W HCPCS: Mod: ER | Performed by: NURSE PRACTITIONER

## 2019-07-16 PROCEDURE — 99284 EMERGENCY DEPT VISIT MOD MDM: CPT | Mod: 25,ER

## 2019-07-16 RX ORDER — BENZONATATE 100 MG/1
100 CAPSULE ORAL 3 TIMES DAILY PRN
Qty: 30 CAPSULE | Refills: 0 | Status: SHIPPED | OUTPATIENT
Start: 2019-07-16 | End: 2019-07-26

## 2019-07-16 RX ORDER — DEXAMETHASONE SODIUM PHOSPHATE 4 MG/ML
10 INJECTION, SOLUTION INTRA-ARTICULAR; INTRALESIONAL; INTRAMUSCULAR; INTRAVENOUS; SOFT TISSUE
Status: COMPLETED | OUTPATIENT
Start: 2019-07-16 | End: 2019-07-16

## 2019-07-16 RX ORDER — FLUTICASONE PROPIONATE 50 MCG
1 SPRAY, SUSPENSION (ML) NASAL DAILY
Qty: 15 G | Refills: 0 | Status: SHIPPED | OUTPATIENT
Start: 2019-07-16 | End: 2020-09-18

## 2019-07-16 RX ORDER — BENZONATATE 100 MG/1
100 CAPSULE ORAL 3 TIMES DAILY PRN
Qty: 30 CAPSULE | Refills: 0 | Status: SHIPPED | OUTPATIENT
Start: 2019-07-16 | End: 2019-07-16 | Stop reason: SDUPTHER

## 2019-07-16 RX ORDER — CETIRIZINE HYDROCHLORIDE 10 MG/1
10 TABLET ORAL DAILY PRN
Qty: 30 TABLET | Refills: 0 | Status: SHIPPED | OUTPATIENT
Start: 2019-07-16 | End: 2022-10-13

## 2019-07-16 RX ADMIN — DEXAMETHASONE SODIUM PHOSPHATE 10 MG: 4 INJECTION, SOLUTION INTRA-ARTICULAR; INTRALESIONAL; INTRAMUSCULAR; INTRAVENOUS; SOFT TISSUE at 10:07

## 2019-07-16 NOTE — ED PROVIDER NOTES
"Encounter Date: 7/16/2019       History     Chief Complaint   Patient presents with    URI     Pt states," Cough, congestion and sore throat for over one week. My throat hurt at first then stopped and started hurting again yesterday."     A 49-year-old female who presents to the ED with cough, congestion, and sore throat.  Patient reports patient reports congestion and sore throat for week.  Patient states her throat began hurting again on yesterday.  Patient reports a dry cough for 2 days.  Patient has no complaints of fever chills, chest pains or shortness of breath.    The history is provided by the patient.   URI   The primary symptoms include sore throat and cough. Primary symptoms do not include fever. The current episode started several days ago. The problem has been gradually worsening.   The sore throat began yesterday. The sore throat has been unchanged since its onset. The sore throat is not accompanied by trouble swallowing or hoarse voice. The sore throat pain is at a severity of 6/10.   The cough began 2 days ago. The cough is dry.   Symptoms associated with the illness include sinus pressure and congestion. The illness is not associated with chills.     Review of patient's allergies indicates:  No Known Allergies  Past Medical History:   Diagnosis Date    Anemia     No blood products      Past Surgical History:   Procedure Laterality Date    HYSTERECTOMY  2017    Dr Lima    HYSTERECTOMY-SUPRA-CERVICAL N/A 3/8/2017    [facility]    TUBAL LIGATION      VAGINAL DELIVERY       x 2     Family History   Problem Relation Age of Onset    Cancer Mother         cervical    Diabetes Mother     Heart disease Father     Diabetes Father     Stroke Maternal Grandmother     Diabetes Maternal Grandmother     Heart disease Maternal Grandfather     Heart disease Paternal Grandfather     Diabetes Brother     Diabetes Brother     Diabetes Brother      Social History "     Tobacco Use    Smoking status: Never Smoker    Smokeless tobacco: Never Used   Substance Use Topics    Alcohol use: No    Drug use: No     Review of Systems   Constitutional: Negative.  Negative for chills and fever.   HENT: Positive for congestion, sinus pressure and sore throat. Negative for hoarse voice and trouble swallowing.    Eyes: Negative.    Respiratory: Positive for cough. Negative for shortness of breath.    Cardiovascular: Negative.  Negative for chest pain.   Gastrointestinal: Negative.    Endocrine: Negative.    Genitourinary: Negative.    Musculoskeletal: Negative.    Skin: Negative.    Allergic/Immunologic: Negative.    Neurological: Negative.    Hematological: Negative.    All other systems reviewed and are negative.      Physical Exam     Initial Vitals [07/16/19 1006]   BP Pulse Resp Temp SpO2   138/82 71 16 98.3 °F (36.8 °C) 99 %      MAP       --         Physical Exam    Nursing note and vitals reviewed.  Constitutional: Vital signs are normal. She appears well-developed. She is cooperative. She does not appear ill.   HENT:   Right Ear: Tympanic membrane and external ear normal.   Left Ear: Tympanic membrane and external ear normal.   Nose: Mucosal edema present. Right sinus exhibits frontal sinus tenderness. Left sinus exhibits frontal sinus tenderness.   Mouth/Throat: Uvula is midline, oropharynx is clear and moist and mucous membranes are normal.   Eyes: Conjunctivae and lids are normal.   Neck: Normal range of motion. Neck supple.   Cardiovascular: Normal rate, regular rhythm, S1 normal, S2 normal and normal heart sounds. Exam reveals no gallop.    No murmur heard.  Pulmonary/Chest: Effort normal and breath sounds normal. No respiratory distress.   Abdominal: Soft. Normal appearance and bowel sounds are normal. There is no tenderness.   Musculoskeletal: Normal range of motion.   Neurological: She is alert and oriented to person, place, and time.   Skin: Skin is warm, dry and intact.    Psychiatric: She has a normal mood and affect. Her speech is normal. Thought content normal.         ED Course   Procedures  Labs Reviewed - No data to display       Imaging Results    None          Medical Decision Making:   Initial Assessment:   A 49-year-old female who presents to the ED with cough, congestion, and sore throat.  Patient reports patient reports congestion and sore throat for week.  Patient states her throat began hurting again on yesterday.  Patient reports a dry cough for 2 days.  Patient has no complaints of fever chills, chest pains or shortness of breath.    Differential Diagnosis:   Upper respiratory infection, acute sinusitis, pharyngitis, allergic rhinitis, bronchitis   ED Management:  Decadron 10 mg IM.  Discharge home with Zyrtec, Fluticasone nasal spray and Tessalon Perles.    Follow-up with PCP in 1-2 days.                      Clinical Impression:       ICD-10-CM ICD-9-CM   1. Upper respiratory tract infection, unspecified type J06.9 465.9                                VITALY Silveira  07/16/19 1115

## 2020-02-24 ENCOUNTER — OFFICE VISIT (OUTPATIENT)
Dept: OBSTETRICS AND GYNECOLOGY | Facility: CLINIC | Age: 50
End: 2020-02-24
Payer: MEDICAID

## 2020-02-24 VITALS
HEIGHT: 65 IN | BODY MASS INDEX: 35.16 KG/M2 | SYSTOLIC BLOOD PRESSURE: 163 MMHG | WEIGHT: 211 LBS | DIASTOLIC BLOOD PRESSURE: 78 MMHG

## 2020-02-24 DIAGNOSIS — K59.01 SLOW TRANSIT CONSTIPATION: ICD-10-CM

## 2020-02-24 PROCEDURE — 99212 OFFICE O/P EST SF 10 MIN: CPT | Mod: S$PBB,,, | Performed by: OBSTETRICS & GYNECOLOGY

## 2020-02-24 PROCEDURE — 99213 OFFICE O/P EST LOW 20 MIN: CPT | Mod: PBBFAC | Performed by: OBSTETRICS & GYNECOLOGY

## 2020-02-24 PROCEDURE — 99212 PR OFFICE/OUTPT VISIT, EST, LEVL II, 10-19 MIN: ICD-10-PCS | Mod: S$PBB,,, | Performed by: OBSTETRICS & GYNECOLOGY

## 2020-02-24 PROCEDURE — 99999 PR PBB SHADOW E&M-EST. PATIENT-LVL III: CPT | Mod: PBBFAC,,, | Performed by: OBSTETRICS & GYNECOLOGY

## 2020-02-24 PROCEDURE — 99999 PR PBB SHADOW E&M-EST. PATIENT-LVL III: ICD-10-PCS | Mod: PBBFAC,,, | Performed by: OBSTETRICS & GYNECOLOGY

## 2020-02-24 NOTE — PROGRESS NOTES
"Cc:  Constipation    HPI:  50 yro  who had WALE on 3/2017 presents to discuss constipation.  Pt is only able to have BM's with Mag Citrate.    Vitals:    20 1342   BP: (!) 163/78   Weight: 95.7 kg (211 lb)   Height: 5' 5" (1.651 m)     Physical Exam   Constitutional: She is oriented to person, place, and time. She appears well-developed and well-nourished. No distress.   HENT:   Head: Normocephalic and atraumatic.   Neck: Normal range of motion.   Cardiovascular: Normal rate.   Pulmonary/Chest: Effort normal. No respiratory distress.   Abdominal: Soft. She exhibits no distension. There is no guarding.   Musculoskeletal: Normal range of motion.   Neurological: She is alert and oriented to person, place, and time. No cranial nerve deficit. Coordination normal.   Skin: She is not diaphoretic.   Psychiatric: She has a normal mood and affect. Her behavior is normal. Judgment and thought content normal.   Vitals reviewed.    Assessment/Plan  1. Slow transit constipation      - discussed 8 oz glass of prune juice and eating papaya to facilitate BM's, will re-eval in 1 month when pt has annual exam   - will re-check BP at that time as well      "

## 2020-07-13 ENCOUNTER — HOSPITAL ENCOUNTER (EMERGENCY)
Facility: HOSPITAL | Age: 50
Discharge: HOME | End: 2020-07-13
Attending: EMERGENCY MEDICINE
Payer: MEDICAID

## 2020-07-13 VITALS
SYSTOLIC BLOOD PRESSURE: 145 MMHG | DIASTOLIC BLOOD PRESSURE: 75 MMHG | OXYGEN SATURATION: 100 % | HEART RATE: 78 BPM | WEIGHT: 215 LBS | RESPIRATION RATE: 18 BRPM | HEIGHT: 65 IN | BODY MASS INDEX: 35.82 KG/M2 | TEMPERATURE: 98 F

## 2020-07-13 DIAGNOSIS — M79.603 ARM PAIN: ICD-10-CM

## 2020-07-13 DIAGNOSIS — M25.512 ACUTE PAIN OF LEFT SHOULDER: Primary | ICD-10-CM

## 2020-07-13 DIAGNOSIS — M79.602 LEFT ARM PAIN: ICD-10-CM

## 2020-07-13 LAB
ALBUMIN SERPL-MCNC: 3.8 G/DL (ref 3.3–5.5)
ALP SERPL-CCNC: 63 U/L (ref 42–141)
BILIRUB SERPL-MCNC: 0.6 MG/DL (ref 0.2–1.6)
BUN SERPL-MCNC: 11 MG/DL (ref 7–22)
CALCIUM SERPL-MCNC: 9 MG/DL (ref 8–10.3)
CHLORIDE SERPL-SCNC: 102 MMOL/L (ref 98–108)
CREAT SERPL-MCNC: 0.8 MG/DL (ref 0.6–1.2)
GLUCOSE SERPL-MCNC: 140 MG/DL (ref 73–118)
POC ALT (SGPT): 22 U/L (ref 10–47)
POC AST (SGOT): 25 U/L (ref 11–38)
POC B-TYPE NATRIURETIC PEPTIDE: 23.4 PG/ML (ref 0–100)
POC CARDIAC TROPONIN I: 0.01 NG/ML
POC TCO2: 28 MMOL/L (ref 18–33)
POTASSIUM BLD-SCNC: 3.6 MMOL/L (ref 3.6–5.1)
PROTEIN, POC: 7.5 G/DL (ref 6.4–8.1)
SAMPLE: NORMAL
SODIUM BLD-SCNC: 137 MMOL/L (ref 128–145)

## 2020-07-13 PROCEDURE — 63600175 PHARM REV CODE 636 W HCPCS: Mod: ER | Performed by: NURSE PRACTITIONER

## 2020-07-13 PROCEDURE — 93005 ELECTROCARDIOGRAM TRACING: CPT | Mod: ER

## 2020-07-13 PROCEDURE — 80053 COMPREHEN METABOLIC PANEL: CPT | Mod: ER

## 2020-07-13 PROCEDURE — 85025 COMPLETE CBC W/AUTO DIFF WBC: CPT | Mod: ER

## 2020-07-13 PROCEDURE — 83880 ASSAY OF NATRIURETIC PEPTIDE: CPT | Mod: ER

## 2020-07-13 PROCEDURE — 99285 EMERGENCY DEPT VISIT HI MDM: CPT | Mod: 25,ER

## 2020-07-13 PROCEDURE — 93010 EKG 12-LEAD: ICD-10-PCS | Mod: ,,, | Performed by: INTERNAL MEDICINE

## 2020-07-13 PROCEDURE — 96374 THER/PROPH/DIAG INJ IV PUSH: CPT | Mod: ER

## 2020-07-13 PROCEDURE — 93010 ELECTROCARDIOGRAM REPORT: CPT | Mod: ,,, | Performed by: INTERNAL MEDICINE

## 2020-07-13 PROCEDURE — 84484 ASSAY OF TROPONIN QUANT: CPT | Mod: ER

## 2020-07-13 RX ORDER — DICLOFENAC SODIUM 50 MG/1
50 TABLET, DELAYED RELEASE ORAL 3 TIMES DAILY PRN
Qty: 24 TABLET | Refills: 0 | Status: SHIPPED | OUTPATIENT
Start: 2020-07-13 | End: 2020-09-18

## 2020-07-13 RX ORDER — METHOCARBAMOL 500 MG/1
1000 TABLET, FILM COATED ORAL EVERY 6 HOURS PRN
Qty: 40 TABLET | Refills: 0 | Status: SHIPPED | OUTPATIENT
Start: 2020-07-13 | End: 2020-09-12

## 2020-07-13 RX ORDER — KETOROLAC TROMETHAMINE 30 MG/ML
30 INJECTION, SOLUTION INTRAMUSCULAR; INTRAVENOUS ONCE
Status: COMPLETED | OUTPATIENT
Start: 2020-07-13 | End: 2020-07-13

## 2020-07-13 RX ADMIN — KETOROLAC TROMETHAMINE 30 MG: 30 INJECTION, SOLUTION INTRAMUSCULAR at 01:07

## 2020-07-13 NOTE — Clinical Note
Bri Roque was seen and treated in our emergency department on 7/13/2020.  She may return to work on 07/15/2020.       If you have any questions or concerns, please don't hesitate to call.      VITALY Silveira

## 2020-07-13 NOTE — ED PROVIDER NOTES
Encounter Date: 7/13/2020    SCRIBE #1 NOTE: I, Amanda Rodarte, am scribing for, and in the presence of,  VITALY Becker. I have scribed the following portions of the note - Other sections scribed: HPI, ROS, PE.       History     Chief Complaint   Patient presents with    Shoulder Pain     LEFT SHOULDER PAIN X 2 WEEKS; DENIES CP, SOB, N/V AND RECENT INJURY TO SHOULDER     This is a nontoxic appearing 50 y.o. female who presents to the ED for evaluation of left shoulder pain for 2 weeks. Patient reports increasing pain with movement. Denies numbness or tingling. She has no complaints of chest pain, shortness of breath, or neck pain.  Patient denies injury.  Patient has been taking Tylenol with no relief.    The history is provided by the patient. No  was used.   Shoulder Pain  This is a new problem. The current episode started more than 1 week ago. The problem has been gradually worsening. Pertinent negatives include no chest pain and no shortness of breath. Nothing relieves the symptoms. She has tried acetaminophen for the symptoms.     Review of patient's allergies indicates:  No Known Allergies  Past Medical History:   Diagnosis Date    Anemia     No blood products      Past Surgical History:   Procedure Laterality Date    HYSTERECTOMY  2017    Dr Lima    TUBAL LIGATION      VAGINAL DELIVERY       x 2     Family History   Problem Relation Age of Onset    Cancer Mother         cervical    Diabetes Mother     Heart disease Father     Diabetes Father     Stroke Maternal Grandmother     Diabetes Maternal Grandmother     Heart disease Maternal Grandfather     Heart disease Paternal Grandfather     Diabetes Brother     Diabetes Brother     Diabetes Brother      Social History     Tobacco Use    Smoking status: Never Smoker    Smokeless tobacco: Never Used   Substance Use Topics    Alcohol use: No    Drug use: No     Review of Systems   Constitutional: Negative.  Negative for  chills and fever.   HENT: Negative.    Eyes: Negative.    Respiratory: Negative.  Negative for shortness of breath.    Cardiovascular: Negative.  Negative for chest pain.   Gastrointestinal: Negative.  Negative for nausea and vomiting.   Endocrine: Negative.    Genitourinary: Negative.    Musculoskeletal: Positive for myalgias (left shoulder pain).   Skin: Negative.    Allergic/Immunologic: Negative.    Neurological: Negative.  Negative for weakness and numbness.   Hematological: Negative.    Psychiatric/Behavioral: Negative.    All other systems reviewed and are negative.      Physical Exam     Initial Vitals [07/13/20 1103]   BP Pulse Resp Temp SpO2   (!) 155/89 72 18 98.2 °F (36.8 °C) 98 %      MAP       --         Physical Exam    Nursing note and vitals reviewed.  Constitutional: She appears well-developed.   HENT:   Head: Normocephalic.   Nose: Nose normal.   Mouth/Throat: Oropharynx is clear and moist.   Eyes: Conjunctivae are normal.   Neck: Normal range of motion. Neck supple. No spinous process tenderness and no muscular tenderness present. Normal range of motion present. No neck rigidity.   Cardiovascular: Normal rate, regular rhythm, S1 normal, S2 normal and normal heart sounds. Exam reveals no gallop and no friction rub.    No murmur heard.  Pulses:       Radial pulses are 2+ on the right side and 2+ on the left side.   Pulmonary/Chest: Effort normal and breath sounds normal. No respiratory distress. She has no wheezes. She has no rhonchi. She has no rales.   Abdominal: Soft. There is no abdominal tenderness.   Musculoskeletal:      Left shoulder: She exhibits decreased range of motion, swelling and pain. She exhibits no deformity.      Comments: Left shoulder with decreased range of motion with pain.     Neurological: She is alert and oriented to person, place, and time.   Skin: Skin is warm and dry.   Psychiatric: She has a normal mood and affect. Her behavior is normal.         ED Course    Procedures  Labs Reviewed   POCT CMP - Abnormal; Notable for the following components:       Result Value    POC Glucose 140 (*)     All other components within normal limits   TROPONIN ISTAT   POCT CBC   POCT CMP   POCT TROPONIN   POCT B-TYPE NATRIURETIC PEPTIDE (BNP)   POCT B-TYPE NATRIURETIC PEPTIDE (BNP)         EKG Readings: (Independently Interpreted)   No STEMI. Rate of 58. Sinus Bradycardia. Normal Axis. Normal EKG. QTc normal at 426. No prior EKG for comparison.        ECG Results          EKG 12-lead (In process)  Result time 07/13/20 13:36:19    In process by Interface, Lab In [facility] (07/13/20 13:36:19)                 Narrative:    Test Reason : M79.602,    Vent. Rate : 058 BPM     Atrial Rate : 058 BPM     P-R Int : 154 ms          QRS Dur : 088 ms      QT Int : 434 ms       P-R-T Axes : 053 057 034 degrees     QTc Int : 426 ms    Sinus bradycardia  Possible Left atrial enlargement  Borderline Abnormal ECG  No previous ECGs available    Referred By: [email or web address]   SELF           Confirmed By:                             Imaging Results          X-Ray Shoulder 2 or More Views Left (Final result)  Result time 07/13/20 14:06:50    Final result by Eloy Jacinto MD (07/13/20 14:06:50)                 Impression:      1. No acute displaced fracture or dislocation of the left shoulder.      Electronically signed by: Eloy Jacinto MD  Date:    07/13/2020  Time:    14:06             Narrative:    EXAMINATION:  XR SHOULDER COMPLETE 2 OR MORE VIEWS LEFT    CLINICAL HISTORY:  pain;    TECHNIQUE:  Two or three views of the left shoulder were performed.    COMPARISON:  None    FINDINGS:  Three views left shoulder.    The left humeral head maintains appropriate relationship with the glenoid.  The left acromioclavicular joint is intact.  No acute displaced left rib fracture.                               X-Ray Chest AP Portable (Final result)  Result time 07/13/20 14:22:01    Final result by Gemma Hoff  MD (07/13/20 14:22:01)                 Impression:      No acute abnormality.      Electronically signed by: Gemma Hoff MD  Date:    07/13/2020  Time:    14:22             Narrative:    EXAMINATION:  XR CHEST AP PORTABLE    CLINICAL HISTORY:  Pain in arm, unspecified    TECHNIQUE:  Single frontal view of the chest was performed.    COMPARISON:  None    FINDINGS:  The lungs are clear, with normal appearance of pulmonary vasculature and no pleural effusion or pneumothorax.    The cardiac silhouette is normal in size. The hilar and mediastinal contours are unremarkable.    Bones are intact.                                 Medical Decision Making:   Initial Assessment:   This is a nontoxic appearing 50 y.o. female who presents to the ED for evaluation of left shoulder pain for 2 weeks. Pt reports increasing pain with movement. Denies numbness or tingling. She has no complaints of chest pain or SOB. Pt has been taking Tylenol with no relief.  Differential Diagnosis:   Acute MI, cervical radiculopathy, tendonitis  Independently Interpreted Test(s):   I have ordered and independently interpreted X-rays - see prior notes.  I have ordered and independently interpreted EKG Reading(s) - see prior notes  Clinical Tests:   Lab Tests: Ordered and Reviewed  Radiological Study: Ordered and Reviewed  Medical Tests: Ordered and Reviewed  ED Management:  Physical exam.  EKG with no evidence of STEMI.  Medicated with Toradol 15 mg intravenously. Pain improved.   Referred to Ortho for follow-up in 2-3 days.   Discharge with anti-inflammatories muscle relaxants.                Scribe Attestation:   Scribe #1: I performed the above scribed service and the documentation accurately describes the services I performed. I attest to the accuracy of the note.    This document was produced by a scribe under my direction and in my presence. I agree with the content of the note and have made any necessary edits.     Yesenia Goodwin  [facility]    07/14/2020 10:08 AM                      Clinical Impression:     1. Acute pain of left shoulder    2. Left arm pain    3. Arm pain                                   Tiny TREY Goodwin, [facility]  07/14/20 1010

## 2020-09-12 ENCOUNTER — OFFICE VISIT (OUTPATIENT)
Dept: URGENT CARE | Facility: CLINIC | Age: 50
End: 2020-09-12
Payer: MEDICAID

## 2020-09-12 VITALS
DIASTOLIC BLOOD PRESSURE: 78 MMHG | WEIGHT: 209 LBS | HEART RATE: 90 BPM | SYSTOLIC BLOOD PRESSURE: 139 MMHG | HEIGHT: 64 IN | RESPIRATION RATE: 20 BRPM | OXYGEN SATURATION: 99 % | TEMPERATURE: 98 F | BODY MASS INDEX: 35.68 KG/M2

## 2020-09-12 DIAGNOSIS — M25.512 ACUTE PAIN OF LEFT SHOULDER: Primary | ICD-10-CM

## 2020-09-12 PROCEDURE — 99203 OFFICE O/P NEW LOW 30 MIN: CPT | Mod: S$GLB,,, | Performed by: PHYSICIAN ASSISTANT

## 2020-09-12 PROCEDURE — 73030 XR SHOULDER COMPLETE 2 OR MORE VIEWS LEFT: ICD-10-PCS | Mod: FY,LT,S$GLB, | Performed by: RADIOLOGY

## 2020-09-12 PROCEDURE — 99203 PR OFFICE/OUTPT VISIT, NEW, LEVL III, 30-44 MIN: ICD-10-PCS | Mod: S$GLB,,, | Performed by: PHYSICIAN ASSISTANT

## 2020-09-12 PROCEDURE — 73030 X-RAY EXAM OF SHOULDER: CPT | Mod: FY,LT,S$GLB, | Performed by: RADIOLOGY

## 2020-09-12 RX ORDER — KETOROLAC TROMETHAMINE 30 MG/ML
30 INJECTION, SOLUTION INTRAMUSCULAR; INTRAVENOUS
Status: COMPLETED | OUTPATIENT
Start: 2020-09-12 | End: 2020-09-12

## 2020-09-12 RX ORDER — TIZANIDINE 2 MG/1
2 TABLET ORAL EVERY 12 HOURS PRN
Qty: 20 TABLET | Refills: 0 | Status: SHIPPED | OUTPATIENT
Start: 2020-09-12 | End: 2020-09-22

## 2020-09-12 RX ORDER — NAPROXEN 500 MG/1
500 TABLET ORAL 2 TIMES DAILY WITH MEALS
Qty: 20 TABLET | Refills: 0 | Status: SHIPPED | OUTPATIENT
Start: 2020-09-12 | End: 2020-09-22

## 2020-09-12 RX ADMIN — KETOROLAC TROMETHAMINE 30 MG: 30 INJECTION, SOLUTION INTRAMUSCULAR; INTRAVENOUS at 12:09

## 2020-09-12 NOTE — PATIENT INSTRUCTIONS
Shoulder Pain with Uncertain Cause  Shoulder pain can have many causes. Pain often comes from the structures that surround the shoulder joint. These are the joint capsule, ligaments, tendons, muscles, and bursa. Pain can also come from cartilage in the joint. Cartilage can become worn out or injured. Its important to know whats causing your pain so the healthcare provider can use the correct treatment. But sometimes its difficult to find the exact cause of shoulder pain. You may need to see a specialist (orthopedist). You may also need special tests such as a CT scan or MRI. The provider may need to use special tools to look inside the joint (arthroscopy).  Shoulder pain can be treated with a sling or a device that keeps your shoulder from moving. You can take an anti-inflammatory medicine such as ibuprofen to ease pain. You may need to do special shoulder exercises. Follow up with a specialist if the pain is severe or doesnt go away after a few weeks.  Home care  Follow these tips when caring for yourself at home:  · If a sling was given to you, leave it in place for the time advised by your healthcare provider. If you arent sure how long to wear it, ask for advice. If the sling becomes loose, adjust it so that your forearm is level with the ground. Your shoulder should feel well supported.  · Put an ice pack on the injured area for 20 minutes every 1 to 2 hours the first day. You can make your own ice pack by putting ice cubes in a plastic bag. Wrap the bag in a thin towel. Continue with ice packs 3 to 4 times a day for the next 2 days. Then use the pack as needed to ease pain and swelling.  · You may use acetaminophen or ibuprofen to control pain, unless another pain medicine was prescribed. If you have chronic liver or kidney disease, talk with your healthcare provider before using these medicines. Also talk with your provider if youve ever had a stomach ulcer or GI bleeding.  · Shoulder pain may seem  worse at night, when there is less to distract you from the pain. If you sleep on your side, try to keep weight off your painful shoulder. Propping pillows behind you may stop you from rolling over onto that shoulder during sleep.   · Shoulder and elbow joints can become stiff if left in a sling for too long. You should start range of motion exercises about 7 to 10 days after the injury. Talk with your provider to find out what type of exercises to do and how soon to start.  · You can take the sling off to shower or bathe.  Follow-up care  Follow up with your healthcare provider if you dont start to get better in the next 5 days.  When to seek medical advice  Call your healthcare provider right away if any of these occur:  · Pain or swelling gets worse or continues for more than a few days  · Your hand or fingers become cold, blue, numb, or tingly  · Large amount of bruising on your shoulder or upper arm  · Difficulty moving your hand or fingers  · Weakness in your hand or fingers  · Your shoulder becomes stiff  · It feels like your shoulder is popping out  · You are less able to do your daily activities  Date Last Reviewed: 10/1/2016  © 7504-2071 [employer]. [address] [place] [address]. All rights reserved. This information is not intended as a substitute for professional medical care. Always follow your healthcare professional's instructions.      Please follow up with your Primary care provider within 2-5 days if your signs and symptoms have not resolved or worsen.     If your condition worsens or fails to improve we recommend that you receive another evaluation at the emergency room immediately or contact your primary medical clinic to discuss your concerns.   You must understand that you have received an Urgent Care treatment only and that you may be released before all of your medical problems are known or treated. You, the patient, will arrange for follow up care as instructed.      RED FLAGS/WARNING SYMPTOMS DISCUSSED WITH PATIENT THAT WOULD WARRANT EMERGENT MEDICAL ATTENTION. PATIENT VERBALIZED UNDERSTANDING.

## 2020-09-12 NOTE — PROGRESS NOTES
"Subjective:       Patient ID: Bri Roque is a 50 y.o. female.    Vitals:  height is 5' 4" (1.626 m) and weight is 94.8 kg (209 lb). Her temperature is 98 °F (36.7 °C). Her blood pressure is 139/78 and her pulse is 90. Her respiration is 20 and oxygen saturation is 99%.     Chief Complaint: Shoulder Pain    Patient states she had left shoulder plain approximately a month ago.  She went to see her doctor.  She states that she did not have any injury or trauma.  She was treated with pain medication muscle relaxer which helped resolve the pain.  She states it started hurting again this past Wednesday and is now waking and she is having trouble sleeping.  She complains of decreased range of motion.  She denies radicular symptoms.  She denies any numbness tingling or weakness of her arm.  Denies any repetitive movements or heavy lifting.    Shoulder Pain   The pain is present in the left shoulder (left upper arm). This is a recurrent problem. The current episode started in the past 7 days (Wednesday). The problem has been waxing and waning. The quality of the pain is described as aching. The pain is at a severity of 10/10. The pain is severe. Associated symptoms include a limited range of motion. Pertinent negatives include no fever, headaches, inability to bear weight, itching, joint locking, joint swelling, numbness, stiffness, tingling or visual symptoms. Exacerbated by: moving arm. She has tried acetaminophen, NSAIDS, cold and heat for the symptoms. The treatment provided mild relief. Family history does not include arthritis. There is no history of diabetes, Injuries to Extremity or migraines.       Constitution: Negative for chills, fatigue and fever.   HENT: Negative for congestion and sore throat.    Neck: Negative for painful lymph nodes.   Cardiovascular: Negative for chest pain and leg swelling.   Eyes: Negative for double vision and blurred vision.   Respiratory: Negative for cough and shortness of " breath.    Gastrointestinal: Negative for nausea, vomiting and diarrhea.   Genitourinary: Negative for dysuria, frequency, urgency and history of kidney stones.   Musculoskeletal: Positive for abnormal ROM of joint. Negative for joint pain, joint swelling, muscle cramps and muscle ache.   Skin: Negative for color change, pale, rash and bruising.   Allergic/Immunologic: Negative for seasonal allergies.   Neurological: Negative for dizziness, history of vertigo, light-headedness, passing out, headaches and numbness.   Hematologic/Lymphatic: Negative for swollen lymph nodes.   Psychiatric/Behavioral: Negative for nervous/anxious, sleep disturbance and depression. The patient is not nervous/anxious.        Objective:      Physical Exam   Constitutional: She is oriented to person, place, and time. She appears well-developed. She is cooperative.  Non-toxic appearance. She does not appear ill. No distress.   HENT:   Head: Normocephalic and atraumatic.   Ears:   Right Ear: Hearing, tympanic membrane, external ear and ear canal normal.   Left Ear: Hearing, tympanic membrane, external ear and ear canal normal.   Nose: Nose normal. No mucosal edema, rhinorrhea or nasal deformity. No epistaxis. Right sinus exhibits no maxillary sinus tenderness and no frontal sinus tenderness. Left sinus exhibits no maxillary sinus tenderness and no frontal sinus tenderness.   Mouth/Throat: Uvula is midline, oropharynx is clear and moist and mucous membranes are normal. No trismus in the jaw. Normal dentition. No uvula swelling. No posterior oropharyngeal erythema.   Eyes: Conjunctivae and lids are normal. Right eye exhibits no discharge. Left eye exhibits no discharge. No scleral icterus.   Neck: Trachea normal, normal range of motion, full passive range of motion without pain and phonation normal. Neck supple.   Cardiovascular: Normal rate, regular rhythm, normal heart sounds and normal pulses.   Pulmonary/Chest: Effort normal and breath  sounds normal. No respiratory distress.   Abdominal: Soft. Normal appearance and bowel sounds are normal. She exhibits no distension, no pulsatile midline mass and no mass. There is no abdominal tenderness.   Musculoskeletal:         General: No deformity.      Left shoulder: She exhibits decreased range of motion, tenderness and pain. She exhibits no bony tenderness, no swelling, no effusion, no crepitus, no deformity, no laceration, normal pulse and normal strength.      Left elbow: She exhibits normal range of motion, no swelling, no effusion, no deformity and no laceration. No tenderness found.      Cervical back: She exhibits normal range of motion, no tenderness, no bony tenderness, no swelling, no edema, no deformity, no laceration, no pain, no spasm and normal pulse.      Left upper arm: She exhibits no tenderness, no bony tenderness, no swelling, no edema, no deformity and no laceration.      Left hand: She exhibits normal range of motion, no tenderness, no bony tenderness, normal two-point discrimination, normal capillary refill, no deformity, no laceration and no swelling. Normal sensation noted. Decreased sensation is not present in the ulnar distribution, is not present in the medial redistribution and is not present in the radial distribution. Normal strength noted. She exhibits no finger abduction, no thumb/finger opposition and no wrist extension trouble.   Neurological: She is alert and oriented to person, place, and time. She exhibits normal muscle tone. Coordination normal.   Skin: Skin is warm, dry, intact, not diaphoretic and not pale. Psychiatric: Her speech is normal and behavior is normal. Judgment and thought content normal.   Nursing note and vitals reviewed.        Assessment:       1. Acute pain of left shoulder        Plan:         Acute pain of left shoulder  -     X-Ray Shoulder 2 or More Views Left; Future; Expected date: 09/12/2020  -     ketorolac injection 30 mg  -     tiZANidine  (ZANAFLEX) 2 MG tablet; Take 1 tablet (2 mg total) by mouth every 12 (twelve) hours as needed (muscle spasms).  Dispense: 20 tablet; Refill: 0  -     naproxen (NAPROSYN) 500 MG tablet; Take 1 tablet (500 mg total) by mouth 2 (two) times daily with meals. for 10 days  Dispense: 20 tablet; Refill: 0  -     Ambulatory referral/consult to Orthopedics     Reviewed radiographs with patient. Discussed diagnosis with patient as well as treatment and home care. Discussed return to clinic precautions vs ER precautions. All patients questions answered. Patient verbalized understanding. Patient agreed with plan of care.         Shoulder Pain with Uncertain Cause  Shoulder pain can have many causes. Pain often comes from the structures that surround the shoulder joint. These are the joint capsule, ligaments, tendons, muscles, and bursa. Pain can also come from cartilage in the joint. Cartilage can become worn out or injured. Its important to know whats causing your pain so the healthcare provider can use the correct treatment. But sometimes its difficult to find the exact cause of shoulder pain. You may need to see a specialist (orthopedist). You may also need special tests such as a CT scan or MRI. The provider may need to use special tools to look inside the joint (arthroscopy).  Shoulder pain can be treated with a sling or a device that keeps your shoulder from moving. You can take an anti-inflammatory medicine such as ibuprofen to ease pain. You may need to do special shoulder exercises. Follow up with a specialist if the pain is severe or doesnt go away after a few weeks.  Home care  Follow these tips when caring for yourself at home:  · If a sling was given to you, leave it in place for the time advised by your healthcare provider. If you arent sure how long to wear it, ask for advice. If the sling becomes loose, adjust it so that your forearm is level with the ground. Your shoulder should feel well supported.  · Put  an ice pack on the injured area for 20 minutes every 1 to 2 hours the first day. You can make your own ice pack by putting ice cubes in a plastic bag. Wrap the bag in a thin towel. Continue with ice packs 3 to 4 times a day for the next 2 days. Then use the pack as needed to ease pain and swelling.  · You may use acetaminophen or ibuprofen to control pain, unless another pain medicine was prescribed. If you have chronic liver or kidney disease, talk with your healthcare provider before using these medicines. Also talk with your provider if youve ever had a stomach ulcer or GI bleeding.  · Shoulder pain may seem worse at night, when there is less to distract you from the pain. If you sleep on your side, try to keep weight off your painful shoulder. Propping pillows behind you may stop you from rolling over onto that shoulder during sleep.   · Shoulder and elbow joints can become stiff if left in a sling for too long. You should start range of motion exercises about 7 to 10 days after the injury. Talk with your provider to find out what type of exercises to do and how soon to start.  · You can take the sling off to shower or bathe.  Follow-up care  Follow up with your healthcare provider if you dont start to get better in the next 5 days.  When to seek medical advice  Call your healthcare provider right away if any of these occur:  · Pain or swelling gets worse or continues for more than a few days  · Your hand or fingers become cold, blue, numb, or tingly  · Large amount of bruising on your shoulder or upper arm  · Difficulty moving your hand or fingers  · Weakness in your hand or fingers  · Your shoulder becomes stiff  · It feels like your shoulder is popping out  · You are less able to do your daily activities  Date Last Reviewed: 10/1/2016  © 5481-7254 [employer]. [address] [place] [address], [place] [address]. All rights reserved. This information is not intended as a substitute for professional  medical care. Always follow your healthcare professional's instructions.      Please follow up with your Primary care provider within 2-5 days if your signs and symptoms have not resolved or worsen.     If your condition worsens or fails to improve we recommend that you receive another evaluation at the emergency room immediately or contact your primary medical clinic to discuss your concerns.   You must understand that you have received an Urgent Care treatment only and that you may be released before all of your medical problems are known or treated. You, the patient, will arrange for follow up care as instructed.     RED FLAGS/WARNING SYMPTOMS DISCUSSED WITH PATIENT THAT WOULD WARRANT EMERGENT MEDICAL ATTENTION. PATIENT VERBALIZED UNDERSTANDING.

## 2020-09-16 NOTE — PROGRESS NOTES
Subjective:      Patient ID: Bri Roque is a 50 y.o. female.    Chief Complaint: No chief complaint on file.      HPI  (French)    Seen by  on 9/12/20 with left shoulder pain. She has 4 month history of intermittent left shoulder pain that flared up last week and kept her up all night. No known injury. Pain is worse with overhead motions and she cannot sleep on left side at night. She rates her pain as a 6 on a scale of 1-10. Improvement with zanaflex. No numbness or tingling. Pain is aching and sore with stiffness.     Given toradol injection at  along with zanaflex and naprosyn. Zanaflex helps. She is not taking naprosyn. No PT, injections, or surgery on her left shoulder.       Past Medical History:   Diagnosis Date    Anemia     No blood products          Current Outpatient Medications:     cetirizine (ZYRTEC) 10 MG tablet, Take 1 tablet (10 mg total) by mouth daily as needed (nasal congestion)., Disp: 30 tablet, Rfl: 0    latanoprost 0.005 % ophthalmic solution, , Disp: , Rfl:     naproxen (NAPROSYN) 500 MG tablet, Take 1 tablet (500 mg total) by mouth 2 (two) times daily with meals. for 10 days, Disp: 20 tablet, Rfl: 0    tiZANidine (ZANAFLEX) 2 MG tablet, Take 1 tablet (2 mg total) by mouth every 12 (twelve) hours as needed (muscle spasms)., Disp: 20 tablet, Rfl: 0    Review of patient's allergies indicates:  No Known Allergies    Review of Systems   Constitution: Positive for weight gain. Negative for fever, malaise/fatigue, night sweats and weight loss.   HENT: Negative for hearing loss, nosebleeds and odynophagia.    Eyes: Negative for blurred vision and double vision.   Cardiovascular: Negative for chest pain, irregular heartbeat and palpitations.   Respiratory: Negative for cough, hemoptysis, shortness of breath and wheezing.    Endocrine: Negative for cold intolerance and polydipsia.   Hematologic/Lymphatic: Does not bruise/bleed easily.   Skin: Negative for dry skin, poor wound  healing, rash and suspicious lesions.   Musculoskeletal:        See HPI for pertinent positives.   Gastrointestinal: Positive for constipation. Negative for bloating, abdominal pain, diarrhea, hematochezia, melena, nausea and vomiting.   Genitourinary: Negative for bladder incontinence, dysuria, hematuria, hesitancy and incomplete emptying.   Neurological: Negative for disturbances in coordination, dizziness, focal weakness, headaches, loss of balance, numbness, paresthesias, seizures and weakness.   Psychiatric/Behavioral: Negative for depression and hallucinations. The patient is not nervous/anxious.          Objective:        LMP 03/06/2017     General    Vitals reviewed.  Constitutional: She is oriented to person, place, and time. She appears well-developed and well-nourished.   Pulmonary/Chest: Effort normal.   Abdominal: She exhibits no distension.   Neurological: She is alert and oriented to person, place, and time.   Psychiatric: She has a normal mood and affect. Her behavior is normal. Judgment and thought content normal.           Observation:    CERVICAL SPINE  No posterior cervical tenderness. Reasonable ROM of cervical spine with no pain.     BILATERAL SHOULDERS:  No ecchymosis, edema, or erythema throughout the shoulder girdle.  No sternal, clavicular, or acromial deformities bilaterally.  No atrophy of the pectorals, deltoids, supraspinatus, infraspinatus, or biceps bilaterally.  No asymmetry of shoulders bilaterally.    ROM OF BOTH SHOULDERS:  Active flexion to 100° on left and 180° on right.   Active abduction to 100° on left and 180° on right.    Active internal rotation to PSIS on left and T12 on right.    Active external rotation to left ear on left and C7 on right.      Strength Testing of both UEs:  Deltoid - 5/5 on left and 5/5 on right  Biceps - 5/5 on left and 5/5 on right  Triceps - 5/5 on left and 5/5 on right  Wrist extension - 5/5 on left and 5/5 on right  Wrist flexion - 5/5 on left and  5/5 on right   - 5/5 on left and 5/5 on right    RIGHT SHOULDER EXAM:  Tenderness:  No tenderness at the SC or AC joint  No tenderness over the clavicle   No tenderness over biceps tendon or bicipital groove  No tenderness over subacromial space    Special Tests:  Empty can test - negative  Full can test - negative  Resisted internal rotation - negative  Resisted external rotation - negative    Neer's test - negative  Hawkin's-Gerald test - negative    Speed's test - negative  Yergason's test - negative    Sulcus sign - none  AP load and shift laxity - none    LEFT SHOULDER EXAM:  Tenderness:  No tenderness at the SC or AC joint  No tenderness over the clavicle   No tenderness over biceps tendon or bicipital groove  No tenderness over subacromial space    Special Tests:  Empty can test - positive with 4/5 strength  Full can test - positive with 4/5 strength  Resisted internal rotation - negative  Resisted external rotation - negative    Neer's test - positive  Hawkin's-Gerald test - positive    Speed's test - negative  Yergason's test - negative    Sulcus sign - none  AP load and shift laxity - none    Neurovascular Exam Bilateral UEs:  2+ radial pulses BL  Sensation intact to light touch in the distal median, radial, and ulnar nerve distributions bilaterally.  Capillary refill intact <2 seconds in all digits bilaterally      XRAY INTERPRETATION:   X-rays of left shoulder dated 9/12/20 are personally reviewed and show minimal degenerative changes with no fracture or dislocation.         Assessment:       Encounter Diagnoses   Name Primary?    Left shoulder pain, unspecified chronicity Yes    Bursitis of left shoulder           Plan:       Diagnoses and all orders for this visit:    Left shoulder pain, unspecified chronicity  -     Ambulatory referral/consult to Physical/Occupational Therapy; Future  -     Large Joint Aspiration/Injection    Bursitis of left shoulder  -     Ambulatory referral/consult to  Physical/Occupational Therapy; Future  -     Large Joint Aspiration/Injection      4 month history of intermittent left shoulder pain that is worse with overhead motions and she cannot sleep on left side at night. XRs with minimal degenerative changes. Exam consistent with bursitis, but cannot rule out rotator cuff tear. Treatment options reviewed with patient along with above left shoulder xrays. Following plan made:     - LEFT shoulder injection done without complication. See procedure note.   - PT orders for left shoulder sent to [facility].    - Okay to continue prn zanaflex from other providers.   - If no improvement with above, consider left shoulder MRI.    Follow up in about 3 months (around 12/18/2020).

## 2020-09-18 ENCOUNTER — OFFICE VISIT (OUTPATIENT)
Dept: ORTHOPEDICS | Facility: CLINIC | Age: 50
End: 2020-09-18
Payer: MEDICAID

## 2020-09-18 DIAGNOSIS — M75.52 BURSITIS OF LEFT SHOULDER: ICD-10-CM

## 2020-09-18 DIAGNOSIS — M25.512 LEFT SHOULDER PAIN, UNSPECIFIED CHRONICITY: Primary | ICD-10-CM

## 2020-09-18 PROCEDURE — 99999 PR PBB SHADOW E&M-EST. PATIENT-LVL III: CPT | Mod: PBBFAC,,, | Performed by: PHYSICIAN ASSISTANT

## 2020-09-18 PROCEDURE — 99213 OFFICE O/P EST LOW 20 MIN: CPT | Mod: PBBFAC,PN | Performed by: PHYSICIAN ASSISTANT

## 2020-09-18 PROCEDURE — 99203 PR OFFICE/OUTPT VISIT, NEW, LEVL III, 30-44 MIN: ICD-10-PCS | Mod: 25,S$PBB,, | Performed by: PHYSICIAN ASSISTANT

## 2020-09-18 PROCEDURE — 99203 OFFICE O/P NEW LOW 30 MIN: CPT | Mod: 25,S$PBB,, | Performed by: PHYSICIAN ASSISTANT

## 2020-09-18 PROCEDURE — 99999 PR PBB SHADOW E&M-EST. PATIENT-LVL III: ICD-10-PCS | Mod: PBBFAC,,, | Performed by: PHYSICIAN ASSISTANT

## 2020-09-18 RX ORDER — TRIAMCINOLONE ACETONIDE 40 MG/ML
40 INJECTION, SUSPENSION INTRA-ARTICULAR; INTRAMUSCULAR
Status: DISCONTINUED | OUTPATIENT
Start: 2020-09-18 | End: 2020-09-18 | Stop reason: HOSPADM

## 2020-09-18 RX ADMIN — TRIAMCINOLONE ACETONIDE 40 MG: 40 INJECTION, SUSPENSION INTRA-ARTICULAR; INTRAMUSCULAR at 08:09

## 2020-09-18 NOTE — LETTER
September 18, 2020      Cheli Moya PA-C  [address] [address]           [facility]  [address] , [facility] [address]  [facility] [address]  Phone: [phone]  Fax: [phone]          Patient: Bri Roque   MR Number: [id number]   YOB: 1970   Date of Visit: 9/18/2020       Dear Cheli Moya:    Thank you for referring Bri Roque to me for evaluation. Attached you will find relevant portions of my assessment and plan of care.    If you have questions, please do not hesitate to call me. I look forward to following Bri Roque along with you.    Sincerely,    Nancy Mirza PA-C    Enclosure  CC:  No Recipients    If you would like to receive this communication electronically, please contact [email or web address] or [phone] to request more information on [employer] access.    For providers and/or their staff who would like to refer a patient to [facility], please contact us through our one-stop-shop provider referral line, [facility], at [phone].    If you feel you have received this communication in error or would no longer like to receive these types of communications, please e-mail [email or web address]

## 2020-09-18 NOTE — PROCEDURES
Large Joint Aspiration/Injection    Date/Time: 9/18/2020 8:00 AM  Performed by: Nancy Mirza PA-C  Authorized by: Nancy Mirza PA-C     Consent Done?:  Yes (Verbal)  Timeout: prior to procedure the correct patient, procedure, and site was verified    Medications:  40 mg triamcinolone acetonide 40 mg/mL     PROCEDURE NOTE FOR LEFT SHOULDER SUBACROMIAL BURSA INJECTION:    I have explained the risks, benefits, and alternatives of the procedure in detail.  The patient voices understanding and all questions have been answered.  The patient agrees to proceed as planned.    After a sterile prep of the skin using chloraprep one step, the area was sprayed with local topical anesthetic and then cleaned with alcohol. A subacromial injection was performed in the LEFT shoulder with a combination of 1 cc of 1% lidocaine and 40mg triamcinolone.     The patient is cautioned that immediate relief of pain is secondary to the local anesthetic and will be temporary. After the anesthetic wears off there may be a increase in pain that may last for a few hours or a few days and they should use ice to help alleviate this this pain.     If patient is diabetic, post injection elevation of blood sugar was discussed. Patient is to check blood sugar regularly and call PCP with any issues.     Patient tolerated the procedure well.

## 2020-09-18 NOTE — PATIENT INSTRUCTIONS
It was nice to meet you today! I am sorry that you are hurting so much.     You likely have some inflammation/bursitis in your shoulder and this is likely what is causing your pain.     The injection that I did today should give you some good relief of pain. It is normal to have some increased soreness over the next few days after an injection. Put ice on it and elevate. This will get better.     I sent physical therapy orders to [facility] on [address]. They should call you to set up, but if not you can call [phone].     If no improvement with above, we can consider an MRI of your left shoulder.     I will see you back in 3 months, but please stay in touch and call me if you need anything. You can also send me a message in [email or web address].     Nancy   [phone]

## 2020-10-06 ENCOUNTER — CLINICAL SUPPORT (OUTPATIENT)
Dept: REHABILITATION | Facility: HOSPITAL | Age: 50
End: 2020-10-06
Payer: MEDICAID

## 2020-10-06 DIAGNOSIS — M25.612 DECREASED ROM OF LEFT SHOULDER: ICD-10-CM

## 2020-10-06 DIAGNOSIS — M25.512 LEFT SHOULDER PAIN, UNSPECIFIED CHRONICITY: ICD-10-CM

## 2020-10-06 DIAGNOSIS — R29.898 DECREASED STRENGTH OF UPPER EXTREMITY: ICD-10-CM

## 2020-10-06 DIAGNOSIS — M25.512 CHRONIC LEFT SHOULDER PAIN: ICD-10-CM

## 2020-10-06 DIAGNOSIS — G89.29 CHRONIC LEFT SHOULDER PAIN: ICD-10-CM

## 2020-10-06 DIAGNOSIS — M75.52 BURSITIS OF LEFT SHOULDER: ICD-10-CM

## 2020-10-06 PROCEDURE — 97110 THERAPEUTIC EXERCISES: CPT | Mod: PN

## 2020-10-06 PROCEDURE — 97162 PT EVAL MOD COMPLEX 30 MIN: CPT | Mod: PN

## 2020-10-06 NOTE — PLAN OF CARE
[facility] [place] [facility]  Physical Therapy Initial Evaluation    Date: 10/6/2020   Name: Bri Roque  Clinic Number: [id number]    Therapy Diagnosis:   Encounter Diagnoses   Name Primary?    Left shoulder pain, unspecified chronicity     Bursitis of left shoulder     Chronic left shoulder pain     Decreased ROM of left shoulder     Decreased strength of left upper extremity       Physician: Nancy Mirza PA-C    Physician Orders: PT Eval and Treat   Medical Diagnosis from Referral:   M25.512 (ICD-10-CM) - Left shoulder pain, unspecified chronicity   M75.52 (ICD-10-CM) - Bursitis of left shoulder   Evaluation Date: 10/6/2020  Authorization Period Expiration: 10/15/2020  Plan of Care Expiration: 1/6/21  Visit # / Visits authorized: 1/ 1    Time In: 1055 (pt arrives late and asks to leave by 1130 for work)  Time Out: 1128  Total Appointment Time (timed & untimed codes): 33 minutes    Precautions: Standard, anemia    Subjective   Date of onset: June 2020  History of current condition -   (10/6/2020): Bri  is a 50 y.o. RHD female who presents to clinic with L anterior shoulder pain that started in June 2020.     left shoulder pain. She has 4 month history of intermittent left shoulder pain that flared up last week and kept her up all night. No known injury. Pain is worse with overhead motions and she cannot sleep on left side at night. She rates her pain as a 6 on a scale of 1-10. Improvement with zanaflex. No numbness or tingling. Pain is aching and sore with stiffness.      Given toradol injection at  along with zanaflex and naprosyn. Zanaflex helps. She is not taking naprosyn. No PT, injections, or surgery on her left shoulder.     Duration of symptoms: about 4 months  Trauma or new activity: No    Pain is Intermittent; Episodes lasting approximately 2 hours every day, when active and at night/ wakes from sleep at night  Current 2/10, worst 10/10, best 2/10   Location: anterior left  shoulder    Description: Aching, Dull and Throbbing  Night pain: present     Disruptive to sleep: Yes  Radicular symptoms: No     Aggravating factors: overhead movements, reaching behind back, laying on L side  Relieving factors: pain medication and rest      Prior treatment/physical therapy: no PT; L shoulder subacromial bursa injection on 9/18/2020  Medication taken for current condition: naprosyn, zanaflex    This is the extent of the patient's complaints at this time.     Social History:  lives alone  Occupation:  at school  Prior Level of Function: no limitations  Current Level of Function: pain and limited with work-related activities, pain with grooming, dressing, bathing, household chores    Pts goals: reduce pain, improve L shoulder mobility    Medical History:   Past Medical History:   Diagnosis Date    Anemia     No blood products        Surgical History:   Bri Roque  has a past surgical history that includes Tubal ligation; Vaginal delivery; and Hysterectomy (2017).    Medications:   Bri has a current medication list which includes the following prescription(s): cetirizine and latanoprost.    Allergies:   Review of patient's allergies indicates:  No Known Allergies     Imaging, x-ray:   Narrative & Impression     EXAMINATION:  XR SHOULDER COMPLETE 2 OR MORE VIEWS LEFT     CLINICAL HISTORY:  Pain in left shoulder     TECHNIQUE:  Three views of the left shoulder were performed.     COMPARISON:  Left shoulder series 07/13/2020     FINDINGS:  Bones are well mineralized. Overall alignment is within normal limits. No displaced fracture, dislocation or destructive osseous process.  Minimal degenerative change noted about the shoulder.  No subcutaneous emphysema or radiodense retained foreign body.     Impression:     No acute displaced fracture-dislocation identified.        Electronically signed by: Tomas Hamilton MD  Date:                                            09/12/2020  Time:                                            12:39         Objective     Posture: Rounded shoulder, Head forward, Affected scapula elevated, Affected scapula upwardly rotated and Increased kyphosis    Active (Passive) Range of Motion: Measured in degrees      Shoulder Left Right   Flexion 90 * (140)  160 (170)    Abduction 80 * (130)  160 (170)    ER  Upper trap   T-4     IR PSIS  T-7     *painful    Upper Extremity Strength  Elbow Left Right   Forearm pronation 4/5  4+/5    Forearm supination 4/5  4+/5    Biceps 3/5*  4+/5    Triceps 4-/5  4+/5    *painful    Shoulder Left Right   Shoulder flexion: 3-/5*  4+/5    Shoulder Abduction: 3-/5 * 4+/5    Shoulder ER 3/5 * 4+/5    Shoulder IR 3+/5  4+/5    Lower Trap 3-/5 * 3+/5    Middle Trap 3-/5 * 3+/5    Rhomboids 3/5  4-/5    *painful    Special Tests:     Shoulder Left Right   AC Joint Compression Test Negative Negative   Empty Can Test Positive Negative   Hawkin's Kenndy Positive Negative   Speed's test Positive Negative     Scapular Control/Dyskinesis:    Normal / Subtle / Obvious  Comments    Left  obvious Difficulty reaching overhead    Right  subtle With overhead movement       Palpation Shoulder:  Severe tenderness to L Biceps Tendon; hypertonicity in B upper traps (L>R)         Limitation/Restriction for FOTO Shoulder Survey    Therapist reviewed FOTO scores for Bri Roque on 10/6/2020.   FOTO documents entered into [employer] - see Media section.    Limitation Score: 57%         TREATMENT   Treatment Time In: 1104  Treatment Time Out: 1128  Total Treatment time (time-based codes) separate from Evaluation: 24 minutes    Bri received therapeutic exercises to develop strength, ROM and flexibility for 24 minutes including:  Wall slides x20  Standing ER YTB 2x15  Standing IR RTB 2x15    NV: pulley flexion, supine AAROM, periscapular strengthening    Home Exercises and Patient Education Provided    Education provided:   - role of PT and POC  - involved anatomy and  pathology  - rationale for treatment and HEP      Written Home Exercises Provided: yes.  Exercises were reviewed and Bri was able to demonstrate them prior to the end of the session.  Bri demonstrated good  understanding of the education provided.     See EMR under Patient Instructions for exercises provided 10/6/2020.    Assessment   Bri is a 50 y.o. female referred to outpatient Physical Therapy with a medical diagnosis of L shoulder pain, bursitis in L shoulder. Pt presents with signs and symptoms consistent with medical diagnosis, including: L anterior shoulder pain, increased L biceps tendon tenderness, positive Speeds test, decreased pain-free L shoulder ROM, decreased L shoulder strength. Pt presents with apprehension to move L shoulder in fear of movement of pain. These deficits are hindering pt's ability to perform household chores, perform work-related activities (mopping, sweeping, lifting), grooming, dressing, and bathing.     Pt prognosis is Good.   Pt will benefit from skilled outpatient Physical Therapy to address the deficits stated above and in the chart below, provide pt/family education, and to maximize pt's level of independence.     Plan of care discussed with patient: Yes  Pt's spiritual, cultural and educational needs considered and patient is agreeable to the plan of care and goals as stated below:     Anticipated Barriers for therapy: chronicity of current injury, fear of re-injury, objective abilities vs. self-perceived abilities and self-limiting behaviors due to pain    Medical Necessity is demonstrated by the following  History  Co-morbidities and personal factors that may impact the plan of care Co-morbidities:   high BMI and anemia    Personal Factors:   coping style     high   Examination  Body Structures and Functions, activity limitations and participation restrictions that may impact the plan of care Body Regions:   upper extremities    Body Systems:    gross  symmetry  ROM  strength  gross coordinated movement  motor control    Participation Restrictions:   Work-related activities, household chores, dressing, grooming, bathing, driving    Activity limitations:   Learning and applying knowledge  no deficits    General Tasks and Commands  no deficits    Communication  no deficits    Mobility  lifting and carrying objects  driving (bike, car, motorcycle)    Self care  washing oneself (bathing, drying, washing hands)  caring for body parts (brushing teeth, shaving, grooming)  dressing    Domestic Life  shopping  cooking  doing house work (cleaning house, washing dishes, laundry)    Interactions/Relationships  no deficits    Life Areas  employment    Community and Social Life  community life  recreation and leisure         high   Clinical Presentation evolving clinical presentation with changing clinical characteristics moderate   Decision Making/ Complexity Score: moderate     Goals:  Short Term Goals: 4 weeks   - Pt to score 54% on FOTO.   - Pt to report at-worst pain 7/10.   - Pt to report little/no difficulty with reaching to shelf at shoulder height.   - Improve L shoulder flexion AROM to 120*  - Pt to report at most moderate difficulty with work-related activities like lifting and mopping/sweeping.   - Pt to be compliant with HEP at least 5x/wk to demonstrate understanding of condition and willingness to self-manage symptoms.       Long Term Goals: 8 weeks   - Pt to score 64% on FOTO.   - Pt to report at-worst pain 3/10.   - Pt to report at little/no difficulty with work-related activities like lifting and mopping/sweeping.   - Pt with L shoulder strength grossly to 4/5.   - Improve L shoulder flexion AROM to 160* for overhead reaching.   - Pt to be compliant with HEP at least 5x/wk to demonstrate competency and independence with management of symptoms.       Plan   Plan of care Certification: 10/6/2020 to 1/6/21.    Outpatient Physical Therapy 1 times weekly for 8  weeks to include the following interventions: Manual Therapy, Moist Heat/ Ice, Neuromuscular Re-ed, Patient Education, Self Care, Therapeutic Activites and Therapeutic Exercise.     Brooks Enriquez, PT, DPT  10/6/2020

## 2020-11-17 ENCOUNTER — DOCUMENTATION ONLY (OUTPATIENT)
Dept: REHABILITATION | Facility: HOSPITAL | Age: 50
End: 2020-11-17

## 2020-11-17 NOTE — PROGRESS NOTES
Outpatient Therapy Discharge Summary     Name: Bri Guerrero [facility] Number: [id number]    Therapy Diagnosis:        Encounter Diagnoses   Name Primary?    Left shoulder pain, unspecified chronicity      Bursitis of left shoulder      Chronic left shoulder pain      Decreased ROM of left shoulder      Decreased strength of left upper extremity        Physician: Nancy Mirza, PAJaunyC     Physician Orders: PT Eval and Treat   Medical Diagnosis from Referral:   M25.512 (ICD-10-CM) - Left shoulder pain, unspecified chronicity   M75.52 (ICD-10-CM) - Bursitis of left shoulder   Evaluation Date: 10/6/2020      Date of Last visit: 10/6/2020  Total Visits Received: 1  Cancelled Visits: 4  No Show Visits: 0    Assessment    Goals: not met; not assessed 2* unanticipated d/c      Discharge reason: Patient has not attended therapy since initial evaluation on 10/6/2020.    Plan   This patient is discharged from Physical Therapy    Brooks Enriquez, PT, DPT  11/17/2020

## 2020-12-17 NOTE — PROGRESS NOTES
Subjective:      Patient ID: Bri Roque is a 50 y.o. female.    Chief Complaint: No chief complaint on file.      HPI  (French)    Last seen by me on 9/18/20 for left shoulder pain. XRs with minimal degenerative changes at last visit.     She was given left shoulder injection (9/18/20) and sent to PT (did 1 visit).     She is here for follow up.     She has no left shoulder pain! She had great improvement with injection and PT. Primary complaint today is right arm pain from her shoulder down into her hand. No neck pain. She rates her pain as a 3 on a scale of 1-10. It is aching. No numbness, tingling, or weakness.       Past Medical History:   Diagnosis Date    Anemia     No blood products          Current Outpatient Medications:     latanoprost 0.005 % ophthalmic solution, , Disp: , Rfl:     cetirizine (ZYRTEC) 10 MG tablet, Take 1 tablet (10 mg total) by mouth daily as needed (nasal congestion). (Patient not taking: Reported on 12/18/2020), Disp: 30 tablet, Rfl: 0    Review of patient's allergies indicates:  No Known Allergies    Review of Systems   Constitution: Negative for chills, fever, night sweats and weight gain.   Gastrointestinal: Negative for bowel incontinence, nausea and vomiting.   Genitourinary: Negative for bladder incontinence.   Neurological: Negative for disturbances in coordination and loss of balance.         Objective:        /80   Pulse 88   Resp 18   Wt 94.3 kg (208 lb)   LMP 03/06/2017   BMI 35.70 kg/m²     Ortho/SPM Exam    ROM OF BOTH SHOULDERS:  Active flexion to 180° on left and 180° on right.   Active abduction to 180° on left and 180° on right.      LEFT SHOULDER EXAM:  Tenderness:  No tenderness at the SC or AC joint  No tenderness over the clavicle   No tenderness over biceps tendon or bicipital groove  No tenderness over subacromial space    Special Tests:  Empty can test - negative  Full can test - negative  Resisted internal rotation - negative  Resisted  external rotation - negative    Neer's test - positive  Hawkin's-Gerald test - positive    Speed's test - negative  Yergason's test - negative    Sulcus sign - none  AP load and shift laxity - none    Neurovascular Exam Bilateral UEs:  2+ radial pulses BL  Sensation intact to light touch in the distal median, radial, and ulnar nerve distributions bilaterally.  Capillary refill intact <2 seconds in all digits bilaterally        Assessment:       Encounter Diagnosis   Name Primary?    Left shoulder pain, unspecified chronicity Yes          Plan:       Diagnoses and all orders for this visit:    Left shoulder pain, unspecified chronicity      Great improvement in left shoulder pain with injection and PT. Primary compliant today is constant right arm pain from shoulder radiating into her hand. No neck pain. Treatment options reviewed with patient and following plan made:     - Activity as tolerated for left shoulder. Can repeat injection or revisit PT if pain gets worse.   - On cursory exam, right arm pain is likely cervical mediated. Advised to follow up with PCP for treatment/referral.   - Can take OTC motrin 800mg tid with food for pain.   - Can take tizanidine (has at home) as needed at night. May make her sleepy.     Follow up if symptoms worsen or fail to improve.

## 2020-12-18 ENCOUNTER — OFFICE VISIT (OUTPATIENT)
Dept: ORTHOPEDICS | Facility: CLINIC | Age: 50
End: 2020-12-18
Payer: MEDICAID

## 2020-12-18 VITALS
DIASTOLIC BLOOD PRESSURE: 80 MMHG | WEIGHT: 208 LBS | HEART RATE: 88 BPM | BODY MASS INDEX: 35.7 KG/M2 | RESPIRATION RATE: 18 BRPM | SYSTOLIC BLOOD PRESSURE: 130 MMHG

## 2020-12-18 DIAGNOSIS — M25.512 LEFT SHOULDER PAIN, UNSPECIFIED CHRONICITY: Primary | ICD-10-CM

## 2020-12-18 PROCEDURE — 99213 PR OFFICE/OUTPT VISIT, EST, LEVL III, 20-29 MIN: ICD-10-PCS | Mod: S$PBB,,, | Performed by: PHYSICIAN ASSISTANT

## 2020-12-18 PROCEDURE — 99213 OFFICE O/P EST LOW 20 MIN: CPT | Mod: PBBFAC,PN | Performed by: PHYSICIAN ASSISTANT

## 2020-12-18 PROCEDURE — 99999 PR PBB SHADOW E&M-EST. PATIENT-LVL III: CPT | Mod: PBBFAC,,, | Performed by: PHYSICIAN ASSISTANT

## 2020-12-18 PROCEDURE — 99213 OFFICE O/P EST LOW 20 MIN: CPT | Mod: S$PBB,,, | Performed by: PHYSICIAN ASSISTANT

## 2020-12-18 PROCEDURE — 99999 PR PBB SHADOW E&M-EST. PATIENT-LVL III: ICD-10-PCS | Mod: PBBFAC,,, | Performed by: PHYSICIAN ASSISTANT

## 2020-12-18 NOTE — PATIENT INSTRUCTIONS
I am glad your left shoulder is better, but sorry your right arm is hurting. Your right arm pain may be coming from your neck. Follow up with your PCP regarding this.     You can try taking the tizanidine at night to see if this helps with pain. It may make you sleepy.     You can also try taking motrin 800mg (4 of the over the counter pills) three times a day as needed. Take with food. Do not take more if it's not helping.     If left shoulder pain gets worse, we can always revisit physical therapy or repeat the injection.     Call me if you need anything.     Nancy   [phone]

## 2021-01-02 ENCOUNTER — OFFICE VISIT (OUTPATIENT)
Dept: URGENT CARE | Facility: CLINIC | Age: 51
End: 2021-01-02
Payer: MEDICAID

## 2021-01-02 VITALS
BODY MASS INDEX: 35.82 KG/M2 | DIASTOLIC BLOOD PRESSURE: 96 MMHG | SYSTOLIC BLOOD PRESSURE: 150 MMHG | RESPIRATION RATE: 18 BRPM | HEIGHT: 65 IN | WEIGHT: 215 LBS | OXYGEN SATURATION: 99 % | TEMPERATURE: 98 F | HEART RATE: 70 BPM

## 2021-01-02 DIAGNOSIS — M79.601 PAIN IN RIGHT ARM: Primary | ICD-10-CM

## 2021-01-02 PROCEDURE — 99213 OFFICE O/P EST LOW 20 MIN: CPT | Mod: S$GLB,,, | Performed by: FAMILY MEDICINE

## 2021-01-02 PROCEDURE — 99213 PR OFFICE/OUTPT VISIT, EST, LEVL III, 20-29 MIN: ICD-10-PCS | Mod: S$GLB,,, | Performed by: FAMILY MEDICINE

## 2021-01-02 RX ORDER — DICLOFENAC SODIUM 75 MG/1
75 TABLET, DELAYED RELEASE ORAL 2 TIMES DAILY PRN
Qty: 30 TABLET | Refills: 3 | Status: SHIPPED | OUTPATIENT
Start: 2021-01-02 | End: 2022-10-13

## 2021-03-06 ENCOUNTER — IMMUNIZATION (OUTPATIENT)
Dept: PRIMARY CARE CLINIC | Facility: CLINIC | Age: 51
End: 2021-03-06
Payer: MEDICAID

## 2021-03-06 DIAGNOSIS — Z23 NEED FOR VACCINATION: Primary | ICD-10-CM

## 2021-03-06 PROCEDURE — 0001A PR IMMUNIZ ADMIN, SARS-COV-2 COVID-19 VACC, 30MCG/0.3ML, 1ST DOSE: ICD-10-PCS | Mod: CV19,S$GLB,, | Performed by: INTERNAL MEDICINE

## 2021-03-06 PROCEDURE — 0001A PR IMMUNIZ ADMIN, SARS-COV-2 COVID-19 VACC, 30MCG/0.3ML, 1ST DOSE: CPT | Mod: CV19,S$GLB,, | Performed by: INTERNAL MEDICINE

## 2021-03-06 PROCEDURE — 91300 PR SARS-COV- 2 COVID-19 VACCINE, NO PRSV, 30MCG/0.3ML, IM: ICD-10-PCS | Mod: S$GLB,,, | Performed by: INTERNAL MEDICINE

## 2021-03-06 PROCEDURE — 91300 PR SARS-COV- 2 COVID-19 VACCINE, NO PRSV, 30MCG/0.3ML, IM: CPT | Mod: S$GLB,,, | Performed by: INTERNAL MEDICINE

## 2021-03-06 RX ADMIN — Medication 0.3 ML: at 08:03

## 2021-03-27 ENCOUNTER — IMMUNIZATION (OUTPATIENT)
Dept: PRIMARY CARE CLINIC | Facility: CLINIC | Age: 51
End: 2021-03-27
Payer: MEDICAID

## 2021-03-27 DIAGNOSIS — Z23 NEED FOR VACCINATION: Primary | ICD-10-CM

## 2021-03-27 PROCEDURE — 0002A PR IMMUNIZ ADMIN, SARS-COV-2 COVID-19 VACC, 30MCG/0.3ML, 2ND DOSE: ICD-10-PCS | Mod: CV19,S$GLB,, | Performed by: INTERNAL MEDICINE

## 2021-03-27 PROCEDURE — 0002A PR IMMUNIZ ADMIN, SARS-COV-2 COVID-19 VACC, 30MCG/0.3ML, 2ND DOSE: CPT | Mod: CV19,S$GLB,, | Performed by: INTERNAL MEDICINE

## 2021-03-27 PROCEDURE — 91300 PR SARS-COV- 2 COVID-19 VACCINE, NO PRSV, 30MCG/0.3ML, IM: CPT | Mod: S$GLB,,, | Performed by: INTERNAL MEDICINE

## 2021-03-27 PROCEDURE — 91300 PR SARS-COV- 2 COVID-19 VACCINE, NO PRSV, 30MCG/0.3ML, IM: ICD-10-PCS | Mod: S$GLB,,, | Performed by: INTERNAL MEDICINE

## 2021-03-27 RX ADMIN — Medication 0.3 ML: at 09:03

## 2021-08-21 ENCOUNTER — OFFICE VISIT (OUTPATIENT)
Dept: URGENT CARE | Facility: CLINIC | Age: 51
End: 2021-08-21
Payer: MEDICAID

## 2021-08-21 VITALS
BODY MASS INDEX: 35.82 KG/M2 | OXYGEN SATURATION: 97 % | SYSTOLIC BLOOD PRESSURE: 161 MMHG | RESPIRATION RATE: 16 BRPM | WEIGHT: 215 LBS | DIASTOLIC BLOOD PRESSURE: 99 MMHG | HEIGHT: 65 IN | TEMPERATURE: 98 F | HEART RATE: 65 BPM

## 2021-08-21 DIAGNOSIS — M79.605 LEFT LEG PAIN: Primary | ICD-10-CM

## 2021-08-21 PROCEDURE — 73562 X-RAY EXAM OF KNEE 3: CPT | Mod: FY,LT,S$GLB, | Performed by: RADIOLOGY

## 2021-08-21 PROCEDURE — 73562 XR KNEE 3 VIEW LEFT: ICD-10-PCS | Mod: FY,LT,S$GLB, | Performed by: RADIOLOGY

## 2021-08-21 PROCEDURE — 99214 PR OFFICE/OUTPT VISIT, EST, LEVL IV, 30-39 MIN: ICD-10-PCS | Mod: S$GLB,,, | Performed by: PHYSICIAN ASSISTANT

## 2021-08-21 PROCEDURE — 99214 OFFICE O/P EST MOD 30 MIN: CPT | Mod: S$GLB,,, | Performed by: PHYSICIAN ASSISTANT

## 2021-08-21 RX ORDER — DICLOFENAC SODIUM 50 MG/1
50 TABLET, DELAYED RELEASE ORAL 2 TIMES DAILY PRN
Qty: 14 TABLET | Refills: 0 | Status: SHIPPED | OUTPATIENT
Start: 2021-08-21 | End: 2021-08-28

## 2021-08-21 RX ORDER — KETOROLAC TROMETHAMINE 30 MG/ML
30 INJECTION, SOLUTION INTRAMUSCULAR; INTRAVENOUS
Status: COMPLETED | OUTPATIENT
Start: 2021-08-21 | End: 2021-08-21

## 2021-08-21 RX ADMIN — KETOROLAC TROMETHAMINE 30 MG: 30 INJECTION, SOLUTION INTRAMUSCULAR; INTRAVENOUS at 09:08

## 2021-08-26 ENCOUNTER — TELEPHONE (OUTPATIENT)
Dept: URGENT CARE | Facility: CLINIC | Age: 51
End: 2021-08-26

## 2022-07-30 ENCOUNTER — OFFICE VISIT (OUTPATIENT)
Dept: URGENT CARE | Facility: CLINIC | Age: 52
End: 2022-07-30
Payer: MEDICAID

## 2022-07-30 VITALS
WEIGHT: 215 LBS | OXYGEN SATURATION: 97 % | HEIGHT: 65 IN | RESPIRATION RATE: 17 BRPM | TEMPERATURE: 99 F | BODY MASS INDEX: 35.82 KG/M2 | HEART RATE: 69 BPM | SYSTOLIC BLOOD PRESSURE: 145 MMHG | DIASTOLIC BLOOD PRESSURE: 94 MMHG

## 2022-07-30 DIAGNOSIS — R10.9 FLANK PAIN: ICD-10-CM

## 2022-07-30 DIAGNOSIS — M79.18 LUMBAR MUSCLE PAIN: Primary | ICD-10-CM

## 2022-07-30 LAB
BILIRUB UR QL STRIP: NEGATIVE
GLUCOSE UR QL STRIP: POSITIVE
KETONES UR QL STRIP: NEGATIVE
LEUKOCYTE ESTERASE UR QL STRIP: NEGATIVE
PH, POC UA: 5 (ref 5–8)
POC BLOOD, URINE: NEGATIVE
POC NITRATES, URINE: NEGATIVE
PROT UR QL STRIP: NEGATIVE
SP GR UR STRIP: 1.02 (ref 1–1.03)
UROBILINOGEN UR STRIP-ACNC: ABNORMAL (ref 0.1–1.1)

## 2022-07-30 PROCEDURE — 3080F PR MOST RECENT DIASTOLIC BLOOD PRESSURE >= 90 MM HG: ICD-10-PCS | Mod: CPTII,S$GLB,, | Performed by: NURSE PRACTITIONER

## 2022-07-30 PROCEDURE — 3008F PR BODY MASS INDEX (BMI) DOCUMENTED: ICD-10-PCS | Mod: CPTII,S$GLB,, | Performed by: NURSE PRACTITIONER

## 2022-07-30 PROCEDURE — 99214 PR OFFICE/OUTPT VISIT, EST, LEVL IV, 30-39 MIN: ICD-10-PCS | Mod: S$GLB,,, | Performed by: NURSE PRACTITIONER

## 2022-07-30 PROCEDURE — 1159F PR MEDICATION LIST DOCUMENTED IN MEDICAL RECORD: ICD-10-PCS | Mod: CPTII,S$GLB,, | Performed by: NURSE PRACTITIONER

## 2022-07-30 PROCEDURE — 3077F PR MOST RECENT SYSTOLIC BLOOD PRESSURE >= 140 MM HG: ICD-10-PCS | Mod: CPTII,S$GLB,, | Performed by: NURSE PRACTITIONER

## 2022-07-30 PROCEDURE — 1159F MED LIST DOCD IN RCRD: CPT | Mod: CPTII,S$GLB,, | Performed by: NURSE PRACTITIONER

## 2022-07-30 PROCEDURE — 4010F ACE/ARB THERAPY RXD/TAKEN: CPT | Mod: CPTII,S$GLB,, | Performed by: NURSE PRACTITIONER

## 2022-07-30 PROCEDURE — 4010F PR ACE/ARB THEARPY RXD/TAKEN: ICD-10-PCS | Mod: CPTII,S$GLB,, | Performed by: NURSE PRACTITIONER

## 2022-07-30 PROCEDURE — 3077F SYST BP >= 140 MM HG: CPT | Mod: CPTII,S$GLB,, | Performed by: NURSE PRACTITIONER

## 2022-07-30 PROCEDURE — 81003 POCT URINALYSIS, DIPSTICK, AUTOMATED, W/O SCOPE: ICD-10-PCS | Mod: QW,S$GLB,, | Performed by: NURSE PRACTITIONER

## 2022-07-30 PROCEDURE — 81003 URINALYSIS AUTO W/O SCOPE: CPT | Mod: QW,S$GLB,, | Performed by: NURSE PRACTITIONER

## 2022-07-30 PROCEDURE — 3080F DIAST BP >= 90 MM HG: CPT | Mod: CPTII,S$GLB,, | Performed by: NURSE PRACTITIONER

## 2022-07-30 PROCEDURE — 3008F BODY MASS INDEX DOCD: CPT | Mod: CPTII,S$GLB,, | Performed by: NURSE PRACTITIONER

## 2022-07-30 PROCEDURE — 99214 OFFICE O/P EST MOD 30 MIN: CPT | Mod: S$GLB,,, | Performed by: NURSE PRACTITIONER

## 2022-07-30 RX ORDER — LOSARTAN POTASSIUM 50 MG/1
50 TABLET ORAL DAILY
COMMUNITY
Start: 2022-07-21

## 2022-07-30 RX ORDER — METFORMIN HYDROCHLORIDE 1000 MG/1
1000 TABLET ORAL 2 TIMES DAILY
COMMUNITY
Start: 2022-07-07 | End: 2022-11-08

## 2022-07-30 RX ORDER — KETOROLAC TROMETHAMINE 10 MG/1
10 TABLET, FILM COATED ORAL EVERY 6 HOURS PRN
Qty: 20 TABLET | Refills: 0 | Status: SHIPPED | OUTPATIENT
Start: 2022-07-30 | End: 2022-08-04

## 2022-07-30 RX ORDER — KETOROLAC TROMETHAMINE 30 MG/ML
30 INJECTION, SOLUTION INTRAMUSCULAR; INTRAVENOUS
Status: COMPLETED | OUTPATIENT
Start: 2022-07-30 | End: 2022-07-30

## 2022-07-30 RX ORDER — KETOROLAC TROMETHAMINE 30 MG/ML
30 INJECTION, SOLUTION INTRAMUSCULAR; INTRAVENOUS
Status: DISCONTINUED | OUTPATIENT
Start: 2022-07-30 | End: 2022-07-30

## 2022-07-30 RX ORDER — SEMAGLUTIDE 1.34 MG/ML
INJECTION, SOLUTION SUBCUTANEOUS
COMMUNITY
Start: 2022-07-21

## 2022-07-30 RX ADMIN — KETOROLAC TROMETHAMINE 30 MG: 30 INJECTION, SOLUTION INTRAMUSCULAR; INTRAVENOUS at 04:07

## 2022-07-30 NOTE — PROGRESS NOTES
"Subjective:       Patient ID: Bri Roque is a 52 y.o. female.    Vitals:  height is 5' 5" (1.651 m) and weight is 97.5 kg (215 lb). Her temperature is 98.7 °F (37.1 °C). Her blood pressure is 145/94 (abnormal) and her pulse is 69. Her respiration is 17 and oxygen saturation is 97%.     Chief Complaint: Flank Pain    Patient presents to the clinic with back pain that radiated to the abdomen and side of the groin. X Tuesday.     Right back wrapping around to groin. No strenuous activities.No numbness and tingling to BLE. Heating pad with minimal relief. Worse with bending forward. No fever chills nausea vomiting. No unusual foods. No history of kidney stones. Aching pain worse at night. No rash reported.    Flank Pain  This is a new problem. The current episode started in the past 7 days. The problem occurs constantly. The problem has been gradually worsening since onset. The pain is present in the lumbar spine. The quality of the pain is described as aching. The pain radiates to the right thigh. The pain is at a severity of 8/10. The pain is moderate. The pain is worse during the night. The symptoms are aggravated by sitting, lying down, bending and twisting. Stiffness is present all day. Associated symptoms include abdominal pain. Pertinent negatives include no bladder incontinence, dysuria, fever or pelvic pain. Treatments tried: tylenol. The treatment provided mild relief.       Constitution: Negative for chills and fever.   Gastrointestinal: Positive for abdominal pain. Negative for abdominal trauma, abdominal bloating, history of abdominal surgery, nausea, vomiting, constipation and diarrhea.   Genitourinary: Positive for flank pain. Negative for dysuria, frequency, urgency, urine decreased, bladder incontinence, hematuria, history of kidney stones and pelvic pain.       Objective:      Physical Exam   Constitutional: She is oriented to person, place, and time. She appears well-developed. She is " cooperative.  Non-toxic appearance. She does not appear ill. No distress.   HENT:   Head: Normocephalic and atraumatic.   Ears:   Right Ear: Hearing, tympanic membrane, external ear and ear canal normal.   Left Ear: Hearing, tympanic membrane, external ear and ear canal normal.   Nose: Nose normal. No mucosal edema, rhinorrhea or nasal deformity. No epistaxis. Right sinus exhibits no maxillary sinus tenderness and no frontal sinus tenderness. Left sinus exhibits no maxillary sinus tenderness and no frontal sinus tenderness.   Mouth/Throat: Uvula is midline, oropharynx is clear and moist and mucous membranes are normal. Mucous membranes are moist. No trismus in the jaw. Normal dentition. No uvula swelling. No posterior oropharyngeal erythema. Oropharynx is clear.   Eyes: Conjunctivae and lids are normal. Pupils are equal, round, and reactive to light. Right eye exhibits no discharge. Left eye exhibits no discharge. No scleral icterus. Extraocular movement intact   Neck: Trachea normal and phonation normal. Neck supple.   Cardiovascular: Normal rate, regular rhythm, normal heart sounds and normal pulses.   Pulmonary/Chest: Effort normal and breath sounds normal. No respiratory distress.   Abdominal: Normal appearance and bowel sounds are normal. She exhibits no distension, no pulsatile midline mass and no mass. Soft. There is no abdominal tenderness.   Musculoskeletal: Normal range of motion.         General: No deformity. Normal range of motion.   Neurological: She is alert and oriented to person, place, and time. She exhibits normal muscle tone. Coordination normal.   Skin: Skin is warm, dry, intact, not diaphoretic and not pale.   Psychiatric: Her speech is normal and behavior is normal. Judgment and thought content normal.   Nursing note and vitals reviewed.        Assessment:       1. Lumbar muscle pain    2. Flank pain          Plan:         Lumbar muscle pain  -     ketorolac injection 30 mg  -     ketorolac  (TORADOL) 10 mg tablet; Take 1 tablet (10 mg total) by mouth every 6 (six) hours as needed for Pain.  Dispense: 20 tablet; Refill: 0    Flank pain  -     POCT Urinalysis, Dipstick, Automated, W/O Scope    Back Strain  Please return here or go to the Emergency Department for any concerns or worsening of condition.   If you were prescribed a narcotic medication, do not drive or operate heavy equipment or machinery while taking these medications.   If you were not prescribed an anti-inflammatory medication, and if you do not have any history of stomach/intestinal ulcers, or kidney disease, or are not taking a blood thinner such as Coumadin, Plavix, Pradaxa, Eloquis, or Xaralta.   - Begin your anti- inflammatory (toradol) on  7/31/22  Warm compresses to the affected area.  If you lose control of your bowel and/or bladder, please go to the nearest Emergency Department immediately.   If you lose sensation in between your legs by your genitalia and/or rectum, please go to the nearest Emergency Department immediately.   Follow up with your PCP in the next 3-5 days or sooner if no improvement.      Patient education provided.  All questions and concerns addressed  Patient verbalizes understanding

## 2022-07-30 NOTE — PATIENT INSTRUCTIONS
Back Strain  Please return here or go to the Emergency Department for any concerns or worsening of condition.   If you were prescribed a narcotic medication, do not drive or operate heavy equipment or machinery while taking these medications.   If you were not prescribed an anti-inflammatory medication, and if you do not have any history of stomach/intestinal ulcers, or kidney disease, or are not taking a blood thinner such as Coumadin, Plavix, Pradaxa, Eloquis, or Xaralta.   - Begin your anti- inflammatory (torodol) on  7/31/22  Warm compresses to the affected area.  If you lose control of your bowel and/or bladder, please go to the nearest Emergency Department immediately.   If you lose sensation in between your legs by your genitalia and/or rectum, please go to the nearest Emergency Department immediately.   Follow up with your PCP in the next 3-5 days or sooner if no improvement.

## 2022-09-06 ENCOUNTER — OFFICE VISIT (OUTPATIENT)
Dept: URGENT CARE | Facility: CLINIC | Age: 52
End: 2022-09-06
Payer: MEDICAID

## 2022-09-06 VITALS
TEMPERATURE: 98 F | DIASTOLIC BLOOD PRESSURE: 89 MMHG | BODY MASS INDEX: 35.82 KG/M2 | SYSTOLIC BLOOD PRESSURE: 144 MMHG | WEIGHT: 215 LBS | HEIGHT: 65 IN | HEART RATE: 80 BPM | RESPIRATION RATE: 17 BRPM

## 2022-09-06 DIAGNOSIS — U07.1 COVID-19: Primary | ICD-10-CM

## 2022-09-06 DIAGNOSIS — R68.83 CHILLS: ICD-10-CM

## 2022-09-06 LAB
CTP QC/QA: YES
SARS-COV-2 RDRP RESP QL NAA+PROBE: POSITIVE

## 2022-09-06 PROCEDURE — 3077F SYST BP >= 140 MM HG: CPT | Mod: CPTII,S$GLB,,

## 2022-09-06 PROCEDURE — U0002 COVID-19 LAB TEST NON-CDC: HCPCS | Mod: QW,S$GLB,,

## 2022-09-06 PROCEDURE — 1160F PR REVIEW ALL MEDS BY PRESCRIBER/CLIN PHARMACIST DOCUMENTED: ICD-10-PCS | Mod: CPTII,S$GLB,,

## 2022-09-06 PROCEDURE — 4010F PR ACE/ARB THEARPY RXD/TAKEN: ICD-10-PCS | Mod: CPTII,S$GLB,,

## 2022-09-06 PROCEDURE — U0002: ICD-10-PCS | Mod: QW,S$GLB,,

## 2022-09-06 PROCEDURE — 99213 PR OFFICE/OUTPT VISIT, EST, LEVL III, 20-29 MIN: ICD-10-PCS | Mod: S$GLB,,,

## 2022-09-06 PROCEDURE — 4010F ACE/ARB THERAPY RXD/TAKEN: CPT | Mod: CPTII,S$GLB,,

## 2022-09-06 PROCEDURE — 99213 OFFICE O/P EST LOW 20 MIN: CPT | Mod: S$GLB,,,

## 2022-09-06 PROCEDURE — 1160F RVW MEDS BY RX/DR IN RCRD: CPT | Mod: CPTII,S$GLB,,

## 2022-09-06 PROCEDURE — 3008F BODY MASS INDEX DOCD: CPT | Mod: CPTII,S$GLB,,

## 2022-09-06 PROCEDURE — 3079F PR MOST RECENT DIASTOLIC BLOOD PRESSURE 80-89 MM HG: ICD-10-PCS | Mod: CPTII,S$GLB,,

## 2022-09-06 PROCEDURE — 1159F MED LIST DOCD IN RCRD: CPT | Mod: CPTII,S$GLB,,

## 2022-09-06 PROCEDURE — 3077F PR MOST RECENT SYSTOLIC BLOOD PRESSURE >= 140 MM HG: ICD-10-PCS | Mod: CPTII,S$GLB,,

## 2022-09-06 PROCEDURE — 1159F PR MEDICATION LIST DOCUMENTED IN MEDICAL RECORD: ICD-10-PCS | Mod: CPTII,S$GLB,,

## 2022-09-06 PROCEDURE — 3079F DIAST BP 80-89 MM HG: CPT | Mod: CPTII,S$GLB,,

## 2022-09-06 PROCEDURE — 3008F PR BODY MASS INDEX (BMI) DOCUMENTED: ICD-10-PCS | Mod: CPTII,S$GLB,,

## 2022-09-06 RX ORDER — OXYMETAZOLINE HCL 0.05 %
2 SPRAY, NON-AEROSOL (ML) NASAL 2 TIMES DAILY
Qty: 15 ML | Refills: 0 | Status: SHIPPED | OUTPATIENT
Start: 2022-09-06 | End: 2022-09-09

## 2022-09-06 RX ORDER — BENZONATATE 100 MG/1
100 CAPSULE ORAL 3 TIMES DAILY PRN
Qty: 30 CAPSULE | Refills: 0 | Status: ON HOLD | OUTPATIENT
Start: 2022-09-06 | End: 2022-11-17 | Stop reason: HOSPADM

## 2022-09-06 NOTE — PROGRESS NOTES
"Subjective:       Patient ID: Bri Roque is a 52 y.o. female.    Vitals:  height is 5' 5" (1.651 m) and weight is 97.5 kg (215 lb). Her temperature is 98.1 °F (36.7 °C). Her blood pressure is 144/89 (abnormal) and her pulse is 80. Her respiration is 17.     Chief Complaint: Fever    Patient presents to the clinic with a cough, sore throat, bodyaches, chills, sweats x yesterday.  Patient is vaccinated.  Patient denies any SOB, CP, n/v, abd pain, dizziness, syncope, numbness/tingling  Prior Tx includes Vicks with mild relief      Fever   This is a new problem. The current episode started yesterday. The problem occurs constantly. The problem has been gradually worsening. Associated symptoms include congestion, coughing, headaches, muscle aches, nausea and a sore throat. Pertinent negatives include no abdominal pain, chest pain, diarrhea, ear pain, rash or vomiting. She has tried acetaminophen (vicks) for the symptoms. The treatment provided mild relief.     Constitution: Positive for chills, sweating and fever.   HENT:  Positive for congestion and sore throat. Negative for ear pain and ear discharge.    Cardiovascular:  Negative for chest pain.   Respiratory:  Positive for cough. Negative for shortness of breath.    Gastrointestinal:  Positive for nausea. Negative for abdominal pain, vomiting and diarrhea.   Skin:  Negative for rash.   Neurological:  Positive for headaches. Negative for dizziness, passing out, numbness and tingling.     Objective:      Physical Exam   Constitutional: She is oriented to person, place, and time. She appears well-developed. She is cooperative.  Non-toxic appearance. She does not appear ill. No distress.   HENT:   Head: Normocephalic and atraumatic.   Ears:   Right Ear: Hearing, tympanic membrane, external ear and ear canal normal.   Left Ear: Hearing, tympanic membrane, external ear and ear canal normal.   Nose: Nose normal. No mucosal edema, rhinorrhea or nasal deformity. No " epistaxis. Right sinus exhibits no maxillary sinus tenderness and no frontal sinus tenderness. Left sinus exhibits no maxillary sinus tenderness and no frontal sinus tenderness.   Mouth/Throat: Uvula is midline, oropharynx is clear and moist and mucous membranes are normal. Mucous membranes are moist. No trismus in the jaw. Normal dentition. No uvula swelling. No oropharyngeal exudate, posterior oropharyngeal edema or posterior oropharyngeal erythema.   Eyes: Conjunctivae and lids are normal. No scleral icterus.   Neck: Trachea normal and phonation normal. Neck supple. No edema present. No erythema present. No neck rigidity present.   Cardiovascular: Normal rate, regular rhythm, normal heart sounds and normal pulses.   Pulmonary/Chest: Effort normal and breath sounds normal. No respiratory distress. She has no decreased breath sounds. She has no rhonchi.   Abdominal: Normal appearance.   Musculoskeletal: Normal range of motion.         General: No deformity. Normal range of motion.   Neurological: She is alert and oriented to person, place, and time. She exhibits normal muscle tone. Coordination normal.   Skin: Skin is warm, dry, intact, not diaphoretic and not pale.   Psychiatric: Her speech is normal.   Nursing note and vitals reviewed.      Assessment:       1. COVID-19    2. Chills          Results for orders placed or performed in visit on 09/06/22   POCT COVID-19 Rapid Screening   Result Value Ref Range    POC Rapid COVID Positive (A) Negative     Acceptable Yes      Plan:         COVID-19  -     benzonatate (TESSALON) 100 MG capsule; Take 1 capsule (100 mg total) by mouth 3 (three) times daily as needed for Cough.  Dispense: 30 capsule; Refill: 0  -     oxymetazoline (AFRIN, OXYMETAZOLINE,) 0.05 % nasal spray; 2 sprays by Nasal route 2 (two) times daily. for 3 days  Dispense: 15 mL; Refill: 0    Chills  -     POCT COVID-19 Rapid Screening       COVID-19 Risk Score = 1    Patient Instructions    Your test was POSITIVE for COVID-19 (coronavirus).       Please isolate yourself at home.  You may leave home and/or return to work once the following conditions are met:    If you were not hospitalized and are not moderately to severely immunocompromised:   More than 5 days since symptoms first appeared AND  More than 24 hours fever free without medications AND  Symptoms are improving  Continue to wear a mask around others for 5 additional days.    If you were hospitalized OR are moderately to severely immunocompromised:  More than 20 days since symptoms first appeared  More than 24 hours fever free without medications  Symptoms have improved    If you had no symptoms but tested positive:  More than 5 days since the date of the first positive test (20 days if moderately to severely immunocompromised). If you develop symptoms, then use the guidelines above.  Continue to wear a mask around others for 5 additional days.      Contact Tracing    As one of the next steps, you will receive a call or text from the [place] Department of Health ([facility]) COVID-19 Tracing Team. See the contact information below so you know not to ignore the health departments call. It is important that you contact them back immediately so they can help.      Contact Tracer Number:  [phone]  Caller ID for most carriers: [facility]     What is contact tracing?  Contact tracing is a process that helps identify everyone who has been in close contact with an infected person. Contact tracers let those people know they may have been exposed and guide them on next steps. Confidentiality is important for everyone; no one will be told who may have exposed them to the virus.  Your involvement is important. The more we know about where and how this virus is spreading, the better chance we have at stopping it from spreading further.  What does exposure mean?  Exposure means you have been within 6 feet for more than 15 minutes with a person who  has or had COVID-19.  What kind of questions do the contact tracers ask?  A contact tracer will confirm your basic contact information including name, address, phone number, and next of kin, as well as asking about any symptoms you may have had. Theyll also ask you how you think you may have gotten sick, such as places where you may have been exposed to the virus, and people you were with. Those names will never be shared with anyone outside of that call, and will only be used to help trace and stop the spread of the virus.   I have privacy concerns. How will the state use my information?  Your privacy about your health is important. All calls are completed using call centers that use the appropriate health privacy protection measures (HIPAA compliance), meaning that your patient information is safe. No one will ever ask you any questions related to immigration status. Your health comes first.   Do I have to participate?  You do not have to participate, but we strongly encourage you to. Contact tracing can help us catch and control new outbreaks as theyre developing to keep your friends and family safe.   What if I dont hear from anyone?  If you dont receive a call within 24 hours, you can call the number above right away to inquire about your status. That line is open from 8:00 am - 8:00 p.m., 7 days a week.  Contact tracing saves lives! Together, we have the power to beat this virus and keep our loved ones and neighbors safe.    For more information see CDC link below.      https://www.cdc.gov/coronavirus/2019-ncov/hcp/guidance-prevent-spread.html#precautions        Sources:  [facility], [place] [facility] and hospitals

## 2022-09-06 NOTE — PATIENT INSTRUCTIONS
Your test was POSITIVE for COVID-19 (coronavirus).       Please isolate yourself at home.  You may leave home and/or return to work once the following conditions are met:    If you were not hospitalized and are not moderately to severely immunocompromised:   More than 5 days since symptoms first appeared AND  More than 24 hours fever free without medications AND  Symptoms are improving  Continue to wear a mask around others for 5 additional days.    If you were hospitalized OR are moderately to severely immunocompromised:  More than 20 days since symptoms first appeared  More than 24 hours fever free without medications  Symptoms have improved    If you had no symptoms but tested positive:  More than 5 days since the date of the first positive test (20 days if moderately to severely immunocompromised). If you develop symptoms, then use the guidelines above.  Continue to wear a mask around others for 5 additional days.      Contact Tracing    As one of the next steps, you will receive a call or text from the [place] [facility] ([facility]) COVID-19 Tracing Team. See the contact information below so you know not to ignore the health departments call. It is important that you contact them back immediately so they can help.      Contact Tracer Number:  [phone]  Caller ID for most carriers: [facility]     What is contact tracing?  Contact tracing is a process that helps identify everyone who has been in close contact with an infected person. Contact tracers let those people know they may have been exposed and guide them on next steps. Confidentiality is important for everyone; no one will be told who may have exposed them to the virus.  Your involvement is important. The more we know about where and how this virus is spreading, the better chance we have at stopping it from spreading further.  What does exposure mean?  Exposure means you have been within 6 feet for more than 15 minutes with a person who  has or had COVID-19.  What kind of questions do the contact tracers ask?  A contact tracer will confirm your basic contact information including name, address, phone number, and next of kin, as well as asking about any symptoms you may have had. Theyll also ask you how you think you may have gotten sick, such as places where you may have been exposed to the virus, and people you were with. Those names will never be shared with anyone outside of that call, and will only be used to help trace and stop the spread of the virus.   I have privacy concerns. How will the state use my information?  Your privacy about your health is important. All calls are completed using call centers that use the appropriate health privacy protection measures (HIPAA compliance), meaning that your patient information is safe. No one will ever ask you any questions related to immigration status. Your health comes first.   Do I have to participate?  You do not have to participate, but we strongly encourage you to. Contact tracing can help us catch and control new outbreaks as theyre developing to keep your friends and family safe.   What if I dont hear from anyone?  If you dont receive a call within 24 hours, you can call the number above right away to inquire about your status. That line is open from 8:00 am - 8:00 p.m., 7 days a week.  Contact tracing saves lives! Together, we have the power to beat this virus and keep our loved ones and neighbors safe.    For more information see CDC link below.      https://www.cdc.gov/coronavirus/2019-ncov/hcp/guidance-prevent-spread.html#precautions        Sources:  [facility], [place] [facility] and [facility]

## 2022-09-06 NOTE — LETTER
"Bri"Radha Roque was seen and treated in our Urgent Care on 9/6/2022.     COVID-19 is present in our communities across the state. There is limited testing for COVID at this time, so not all patients can be tested. In this situation, your employee meets the following criteria:    Bri Roque has met the criteria for COVID-19 testing and has a POSITIVE result. She can return to work once they are asymptomatic for 24 hours without the use of fever reducing medications AND at least five days from the first positive result. A mask is recommended for 5 days post quarantine.     If you have any questions or concerns, or if I can be of further assistance, please do not hesitate to contact me.    Sincerely,           [unfilled]  "

## 2022-10-13 ENCOUNTER — TELEPHONE (OUTPATIENT)
Dept: GASTROENTEROLOGY | Facility: CLINIC | Age: 52
End: 2022-10-13
Payer: MEDICAID

## 2022-10-13 ENCOUNTER — PATIENT MESSAGE (OUTPATIENT)
Dept: GASTROENTEROLOGY | Facility: CLINIC | Age: 52
End: 2022-10-13
Payer: MEDICAID

## 2022-10-13 RX ORDER — SODIUM, POTASSIUM,MAG SULFATES 17.5-3.13G
1 SOLUTION, RECONSTITUTED, ORAL ORAL DAILY
Qty: 1 KIT | Refills: 0 | Status: SHIPPED | OUTPATIENT
Start: 2022-10-13 | End: 2022-10-15

## 2022-10-13 NOTE — TELEPHONE ENCOUNTER
Referring Physician: Av Ann NP                             Date: 10/13/2022    Reason for Referral: Screening colonoscopy      Family History of:   Colon polyp: no  Relationship/Age of Onset:       Colon cancer: no  Relationship/Age of Onset:       Patient with:   Hemoccults Done:       Iron deficient:   no      On Blood Thinner: No      Valvular heart disease/valve replacement: No      Anemia Present: No      On NSAID: No    On Adipex or phentermine: No      Lung disease: No      Kidney disease: No      Hx of polyps:       Hx of colon cancer:       Previous colon evalations: First colonoscopy  When:   Where:   Pertinent symptoms:           Review of patient's allergies indicates: Metformin        Patient was scheduled for colonoscopy on  11/17/2022      with Dr. Forte at [facility].       instructions were reviewed with patient.         Prep sent to [facility] in [place]    SUPREP Instructions    You are scheduled for a colonoscopy with Dr. Forte on 11/17/2022 at [facility]. Enter through the [facility] Entrance and check in at [facility].  To ensure that your test is accurate and complete, you MUST follow these instructions listed below.  If you have any questions, please call our office at [phone].  Plan on being at the hospital for your procedure for 3-4 hours.    1.  Follow a CLEAR LIQUID DIET for the entire day before your scheduled colonoscopy.  This means no solid food the entire day starting when you wake.  You may have as much of the clear liquids as you want throughout the day.   CLEAR LIQUID DIET:      - NO DAIRY   - You can have:  Coffee with sugar (no creamer), tea, water, soda, apple or white grape juice, chicken or beef broth/bouillon (no meat, noodles, or veggies),  popsicles, , lemonade.    2.  AT 5 pm the evening before your colonoscopy, POUR ONE (1) BOTTLE OF SUPREP INTO THE MIXING CONTAINER, PROVIDED INSIDE THE BOX.  ADD WATER TO THE LINE ON THE CONTAINER AND MIX IT  WELL.  DRINK THE ENTIRE CONTAINER AND THEN DRINK TWO (2) MORE CONTAINERS OF WATER OVER THE NEXT 1 HOUR.  This is sometimes easier to drink if this solution is cold, so you can mix the solution 20 minutes ahead of time and place in the refrigerator prior to drinking.  You have to drink the solution within 30-45 minutes of mixing it.  Do NOT put this solution over ice.  It IS ok to drink with a straw.    3.  The endoscopy department will call you 1 day before your colonoscopy to tell you the exact time to arrive, AND to tell you the exact time to drink the 2nd portion of your prep (which will be FIVE HOURS BEFORE YOUR ARRIVAL TIME).  At this time given to you, POUR ONE (1) BOTTLE OF SUPREP INTO THE MIXING CONTAINER, PROVIDED INSIDE THE BOX.  ADD WATER TO THE LINE ON THE CONTAINER AND MIX IT WELL.  DRINK THE ENTIRE CONTAINER AND THEN DRINK TWO (2) MORE CONTAINERS OF WATER OVER THE NEXT 1 HOUR.  This is sometimes easier to drink if this solution is cold, so you can mix the solution 20 minutes ahead of time and place in the refrigerator prior to drinking.  You have to drink the solution within 30-45 minutes of mixing it.  Do NOT put this solution over ice.  It IS ok to drink with a straw.  Once this is complete, you may not have ANYTHING else by mouth!    4.  You must have someone with you to DRIVE YOU HOME since you will be receiving IV sedation for the colonoscopy.    5.  It is ok to take MOST of your REGULAR MEDICATIONS  in the morning of your test with a SIP of water.  THE ONLY MEDS YOU NEED TO HOLD ARE YOUR DIABETES MEDICATIONS,  SOME BLOOD PRESSURE MEDS, AND BLOOD THINNERS IF OK'D BY YOUR DOCTOR.  Do NOT have anything else to eat or drink the morning of your colonoscopy.  It is ok to brush your teeth.    6.  If you are on blood thinners THAT YOU HAVE BEEN INSTRUCTED TO HOLD BY YOUR DOCTOR FOR THIS PROCEDURE, then do NOT take this the morning of your colonoscopy.  Do NOT stop these medications on your own, they must  be approved to be held by your doctor.  Your colonoscopy can NOT be done if you are on these medications.  Examples of blood thinners include: Coumadin, Aggrenox, Plavix, Pradaxa, Reapro, Pletal, Xarelto, Ticagrelor, Brilinta, Eliquis, and high dose aspirin (325 mg).  You do not have to stop baby aspirin 81 mg.    7.  IF YOU ARE DIABETIC:  NO INSULIN OR ORAL MEDICATIONS THE MORNING OF THE COLONOSCOPY.  TAKE ONLY HALF THE DOSE OF YOUR INSULIN THE DAY BEFORE THE COLONOSCOPY.  DO NOT TAKE ANY ORAL DIABETIC MEDICATIONS THE DAY BEFORE THE COLONOSCOPY.  IF YOU ARE AN INSULIN DEPENDENT DIABETIC WITH UNSTABLE BLOOD SUGARS, NOTIFY YOUR PRIMARY CARE PHYSICIAN FOR INSTRUCTIONS.

## 2022-11-08 ENCOUNTER — OFFICE VISIT (OUTPATIENT)
Dept: OBSTETRICS AND GYNECOLOGY | Facility: CLINIC | Age: 52
End: 2022-11-08
Payer: MEDICAID

## 2022-11-08 VITALS
DIASTOLIC BLOOD PRESSURE: 90 MMHG | BODY MASS INDEX: 32.69 KG/M2 | WEIGHT: 196.44 LBS | SYSTOLIC BLOOD PRESSURE: 122 MMHG

## 2022-11-08 DIAGNOSIS — Z01.419 ROUTINE GYNECOLOGICAL EXAMINATION: Primary | ICD-10-CM

## 2022-11-08 DIAGNOSIS — N89.8 VAGINAL DISCHARGE: ICD-10-CM

## 2022-11-08 DIAGNOSIS — Z90.710 HISTORY OF TOTAL ABDOMINAL HYSTERECTOMY: ICD-10-CM

## 2022-11-08 LAB
BILIRUB SERPL-MCNC: NORMAL MG/DL
BLOOD URINE, POC: NORMAL
CLARITY, POC UA: NORMAL
COLOR, POC UA: YELLOW
GLUCOSE UR QL STRIP: NORMAL
KETONES UR QL STRIP: NORMAL
LEUKOCYTE ESTERASE URINE, POC: NORMAL
NITRITE, POC UA: NORMAL
PH, POC UA: 5
PROTEIN, POC: NORMAL
SPECIFIC GRAVITY, POC UA: 1.03
UROBILINOGEN, POC UA: NORMAL

## 2022-11-08 PROCEDURE — 99212 OFFICE O/P EST SF 10 MIN: CPT | Mod: PBBFAC | Performed by: OBSTETRICS & GYNECOLOGY

## 2022-11-08 PROCEDURE — 99396 PR PREVENTIVE VISIT,EST,40-64: ICD-10-PCS | Mod: S$PBB,,, | Performed by: OBSTETRICS & GYNECOLOGY

## 2022-11-08 PROCEDURE — 1159F MED LIST DOCD IN RCRD: CPT | Mod: CPTII,,, | Performed by: OBSTETRICS & GYNECOLOGY

## 2022-11-08 PROCEDURE — 3074F PR MOST RECENT SYSTOLIC BLOOD PRESSURE < 130 MM HG: ICD-10-PCS | Mod: CPTII,,, | Performed by: OBSTETRICS & GYNECOLOGY

## 2022-11-08 PROCEDURE — 4010F ACE/ARB THERAPY RXD/TAKEN: CPT | Mod: CPTII,,, | Performed by: OBSTETRICS & GYNECOLOGY

## 2022-11-08 PROCEDURE — 3008F PR BODY MASS INDEX (BMI) DOCUMENTED: ICD-10-PCS | Mod: CPTII,,, | Performed by: OBSTETRICS & GYNECOLOGY

## 2022-11-08 PROCEDURE — 3074F SYST BP LT 130 MM HG: CPT | Mod: CPTII,,, | Performed by: OBSTETRICS & GYNECOLOGY

## 2022-11-08 PROCEDURE — 3080F PR MOST RECENT DIASTOLIC BLOOD PRESSURE >= 90 MM HG: ICD-10-PCS | Mod: CPTII,,, | Performed by: OBSTETRICS & GYNECOLOGY

## 2022-11-08 PROCEDURE — 4010F PR ACE/ARB THEARPY RXD/TAKEN: ICD-10-PCS | Mod: CPTII,,, | Performed by: OBSTETRICS & GYNECOLOGY

## 2022-11-08 PROCEDURE — 3008F BODY MASS INDEX DOCD: CPT | Mod: CPTII,,, | Performed by: OBSTETRICS & GYNECOLOGY

## 2022-11-08 PROCEDURE — 99396 PREV VISIT EST AGE 40-64: CPT | Mod: S$PBB,,, | Performed by: OBSTETRICS & GYNECOLOGY

## 2022-11-08 PROCEDURE — 1159F PR MEDICATION LIST DOCUMENTED IN MEDICAL RECORD: ICD-10-PCS | Mod: CPTII,,, | Performed by: OBSTETRICS & GYNECOLOGY

## 2022-11-08 PROCEDURE — 3080F DIAST BP >= 90 MM HG: CPT | Mod: CPTII,,, | Performed by: OBSTETRICS & GYNECOLOGY

## 2022-11-08 PROCEDURE — 99999 PR PBB SHADOW E&M-EST. PATIENT-LVL II: ICD-10-PCS | Mod: PBBFAC,,, | Performed by: OBSTETRICS & GYNECOLOGY

## 2022-11-08 PROCEDURE — 81002 URINALYSIS NONAUTO W/O SCOPE: CPT | Mod: PBBFAC | Performed by: OBSTETRICS & GYNECOLOGY

## 2022-11-08 PROCEDURE — 81514 NFCT DS BV&VAGINITIS DNA ALG: CPT | Performed by: OBSTETRICS & GYNECOLOGY

## 2022-11-08 PROCEDURE — 99999 PR PBB SHADOW E&M-EST. PATIENT-LVL II: CPT | Mod: PBBFAC,,, | Performed by: OBSTETRICS & GYNECOLOGY

## 2022-11-08 NOTE — PROGRESS NOTES
Subjective:       Patient ID: Bri Roque is a 52 y.o. female.    Chief Complaint:  Vaginal Discharge      History of Present Illness  HPI  Annual Exam-Postmenopausal  Patient presents for annual exam. The patient has no complaints today. The patient is not currently sexually active for several years. GYN screening history: last pap: approximate date  and was normal and pt had total hysterectomy done for benign indications  . The patient is not taking hormone replacement therapy. Patient denies post-menopausal vaginal bleeding. The patient wears seatbelts: yes. The patient participates in regular exercise: no. Has the patient ever been transfused or tattooed?: not asked. The patient reports that there is not domestic violence in her life.    Pt does MMGs at [facility] and pt states she is up to date.    Pt concerned about 2 days of brown discharge.  Pt admits some mild RLQ pain.  Pt denies any urinary sx's.    GYN & OB History  Patient's last menstrual period was 2017 (exact date).   Date of Last Pap: No result found    OB History    Para Term  AB Living   2 2 2     4   SAB IAB Ectopic Multiple Live Births           2      # Outcome Date GA Lbr Joseluis/2nd Weight Sex Delivery Anes PTL Lv   2 Term 94     Vag-Spont   JOSE ELIAS   1 Term 88     Vag-Spont   JOSE ELIAS       Review of Systems  Review of Systems   Constitutional: Negative.    Respiratory: Negative.     Cardiovascular: Negative.    Gastrointestinal: Negative.    Endocrine: Positive for hot flashes. Negative for diabetes, hair loss, hyperthyroidism and hypothyroidism.   Genitourinary:  Positive for hot flashes.   Musculoskeletal:  Positive for joint swelling and myalgias.   Neurological: Negative.    Hematological: Negative.    Psychiatric/Behavioral: Negative.     Breast: negative.          Objective:    Physical Exam:   Constitutional: She is oriented to person, place, and time. She appears well-developed and well-nourished.  No distress.    HENT:   Head: Normocephalic and atraumatic.       Pulmonary/Chest: Effort normal. No respiratory distress. Right breast exhibits no inverted nipple, no mass, no nipple discharge, no skin change, no tenderness, presence, no bleeding, no swelling, no mastectomy, no augmentation and no lumpectomy. Left breast exhibits no inverted nipple, no mass, no nipple discharge, no skin change, no tenderness, presence, no bleeding, no swelling, no mastectomy, no augmentation and no lumpectomy.        Abdominal: Soft. She exhibits no distension and no mass. There is no abdominal tenderness. There is no rebound and no guarding.     Genitourinary:    Vagina, right adnexa and left adnexa normal.      Pelvic exam was performed with patient supine.   The external female genitalia was normal.   No external genitalia lesions identified,Genitalia hair distrobution normal .   Labial bartholins normal.There is no rash, tenderness, lesion or injury on the right labia. There is no rash, tenderness, lesion or injury on the left labia. No no adexnal prolapse, no nodularity or no masses or organomegaly. Right adnexum displays no mass, no tenderness and no fullness. Left adnexum displays no mass, no tenderness and no fullness. Vagina exhibits no lesion. Vaginal cuff normal.  No erythema,  no vaginal discharge (normal, white, physiologic appearing), tenderness, bleeding, rectocele, cystocele or unspecified prolapse of vaginal walls in the vagina.    No foreign body in the vagina.      No signs of injury in the vagina.   Cervix is absent.Uterus is absent. Normal urethral meatus.Urethral Meatus exhibits: urethral lesion and prolapsedUrethra findings: no urethral mass, no tenderness and no urethral scarringBladder findings: no bladder distention and no bladder tenderness          Musculoskeletal: Normal range of motion and moves all extremeties. No tenderness.       Neurological: She is alert and oriented to person, place, and time. No  cranial nerve deficit. Coordination normal.    Skin: She is not diaphoretic.    Psychiatric: She has a normal mood and affect. Her behavior is normal. Judgment and thought content normal.        Assessment:        1. Routine gynecological examination    2. History of total abdominal hysterectomy    3. Vaginal discharge              Plan:       Pap smears no longer indicated  MMG's done without need for order  AFFIRM collected

## 2022-11-10 LAB
BACTERIAL VAGINOSIS DNA: NEGATIVE
CANDIDA GLABRATA DNA: NEGATIVE
CANDIDA KRUSEI DNA: NEGATIVE
CANDIDA RRNA VAG QL PROBE: POSITIVE
T VAGINALIS RRNA GENITAL QL PROBE: NEGATIVE

## 2022-11-12 ENCOUNTER — TELEPHONE (OUTPATIENT)
Dept: OBSTETRICS AND GYNECOLOGY | Facility: HOSPITAL | Age: 52
End: 2022-11-12
Payer: MEDICAID

## 2022-11-12 DIAGNOSIS — B37.31 YEAST VAGINITIS: Primary | ICD-10-CM

## 2022-11-12 RX ORDER — FLUCONAZOLE 150 MG/1
150 TABLET ORAL DAILY
Qty: 1 TABLET | Refills: 0 | Status: SHIPPED | OUTPATIENT
Start: 2022-11-12 | End: 2022-11-13

## 2022-11-30 ENCOUNTER — PATIENT MESSAGE (OUTPATIENT)
Dept: GASTROENTEROLOGY | Facility: CLINIC | Age: 52
End: 2022-11-30
Payer: MEDICAID

## 2023-04-19 ENCOUNTER — PATIENT MESSAGE (OUTPATIENT)
Dept: RESEARCH | Facility: HOSPITAL | Age: 53
End: 2023-04-19
Payer: MEDICAID

## 2023-05-15 ENCOUNTER — OFFICE VISIT (OUTPATIENT)
Dept: URGENT CARE | Facility: CLINIC | Age: 53
End: 2023-05-15
Payer: MEDICAID

## 2023-05-15 VITALS
WEIGHT: 194 LBS | BODY MASS INDEX: 32.32 KG/M2 | DIASTOLIC BLOOD PRESSURE: 86 MMHG | RESPIRATION RATE: 18 BRPM | HEART RATE: 76 BPM | OXYGEN SATURATION: 96 % | TEMPERATURE: 98 F | HEIGHT: 65 IN | SYSTOLIC BLOOD PRESSURE: 133 MMHG

## 2023-05-15 DIAGNOSIS — M79.601 RIGHT ARM PAIN: ICD-10-CM

## 2023-05-15 DIAGNOSIS — M77.8 TRICEPS TENDINITIS: Primary | ICD-10-CM

## 2023-05-15 PROCEDURE — 99213 OFFICE O/P EST LOW 20 MIN: CPT | Mod: S$GLB,,, | Performed by: NURSE PRACTITIONER

## 2023-05-15 PROCEDURE — 73080 X-RAY EXAM OF ELBOW: CPT | Mod: FY,RT,S$GLB, | Performed by: INTERNAL MEDICINE

## 2023-05-15 PROCEDURE — 73080 XR ELBOW COMPLETE 3 VIEW RIGHT: ICD-10-PCS | Mod: FY,RT,S$GLB, | Performed by: INTERNAL MEDICINE

## 2023-05-15 PROCEDURE — 99213 PR OFFICE/OUTPT VISIT, EST, LEVL III, 20-29 MIN: ICD-10-PCS | Mod: S$GLB,,, | Performed by: NURSE PRACTITIONER

## 2023-05-15 RX ORDER — LOSARTAN POTASSIUM 50 MG/1
1 TABLET ORAL DAILY
COMMUNITY
Start: 2022-09-29

## 2023-05-15 RX ORDER — NAPROXEN 500 MG/1
500 TABLET ORAL 2 TIMES DAILY WITH MEALS
Qty: 20 TABLET | Refills: 0 | Status: SHIPPED | OUTPATIENT
Start: 2023-05-15 | End: 2023-05-25

## 2023-05-15 RX ORDER — LANCETS 33 GAUGE
EACH MISCELLANEOUS 3 TIMES DAILY
COMMUNITY
Start: 2022-12-07

## 2023-05-15 RX ORDER — FLUCONAZOLE 150 MG/1
TABLET ORAL
COMMUNITY

## 2023-05-15 RX ORDER — SEMAGLUTIDE 1.34 MG/ML
INJECTION, SOLUTION SUBCUTANEOUS
COMMUNITY
Start: 2022-10-03

## 2023-05-15 NOTE — PROGRESS NOTES
"Subjective:      Patient ID: Bri Roque is a 53 y.o. female.    Vitals:  height is 5' 5" (1.651 m) and weight is 88 kg (194 lb). Her oral temperature is 98.1 °F (36.7 °C). Her blood pressure is 133/86 and her pulse is 76. Her respiration is 18 and oxygen saturation is 96%.     Chief Complaint: Arm Pain    53 yr old female came in with complaints of intermittent right arm pain for 5 months. Denies known injury. Reports increased pain in cold weather. Denies feeling of stiffness, describes it as aching pain down lateral forearm. Denies taking anything for pain.     Arm Pain   Incident onset: 5 months ago. There was no injury mechanism. The pain is present in the right forearm. The quality of the pain is described as aching. The pain does not radiate. The pain is at a severity of 6/10. The pain is mild. The pain has been Intermittent since the incident. Pertinent negatives include no chest pain, muscle weakness, numbness or tingling. Nothing aggravates the symptoms. She has tried nothing for the symptoms.     Cardiovascular:  Negative for chest pain.   Musculoskeletal:  Positive for pain. Negative for trauma, joint swelling, abnormal ROM of joint and arthritis.   Skin:  Negative for wound, abrasion, erythema and bruising.   Neurological:  Negative for numbness.    Objective:     Physical Exam   Constitutional: She does not appear ill. No distress.   Neck: No decreased range of motion present.   Cardiovascular: Normal rate, regular rhythm, normal heart sounds and normal pulses.   Pulmonary/Chest: Effort normal and breath sounds normal. No respiratory distress.   Abdominal: Normal appearance.   Musculoskeletal:      Right shoulder: Normal. She exhibits normal range of motion, no tenderness and normal strength.      Left shoulder: Normal.      Right elbow: Normal.She exhibits normal range of motion. No tenderness found.      Left elbow: Normal.      Cervical back: She exhibits no tenderness and no bony tenderness. "      Thoracic back: Normal.      Lumbar back: Normal.      Right forearm: She exhibits tenderness (lateral forearm). She exhibits no bony tenderness, no swelling, no edema, no deformity and no laceration.      Left forearm: Normal.   Neurological: She is alert.   Skin: No erythema   Nursing note and vitals reviewed.    XR ELBOW COMPLETE 3 VIEW RIGHT    Result Date: 5/15/2023  EXAMINATION: XR ELBOW COMPLETE 3 VIEW RIGHT CLINICAL HISTORY: . Pain in right arm TECHNIQUE: AP, lateral, and oblique views of the right elbow were performed. COMPARISON: None FINDINGS: Mild triceps enthesopathy.  No other bone, joint, or soft tissue abnormality is seen.     As above. Electronically signed by: Philippe Esquivel Date:    05/15/2023 Time:    10:11     Assessment:     1. Triceps tendinitis    2. Right arm pain        Plan:     Triceps tendinitis  -     Ambulatory referral/consult to Orthopedics  -     naproxen (NAPROSYN) 500 MG tablet; Take 1 tablet (500 mg total) by mouth 2 (two) times daily with meals. for 10 days  Dispense: 20 tablet; Refill: 0    Right arm pain  -     XR ELBOW COMPLETE 3 VIEW RIGHT; Future; Expected date: 05/15/2023    Start naproxen as prescribed. Follow up with ortho.

## 2023-05-15 NOTE — PATIENT INSTRUCTIONS
Start naproxen as prescribed  Ice affected area for 10-15 minutes 2-3 times a day    Call [phone] to schedule an appointment with orthopedics.    - Go to the ER or seek medical attention immediately if you develop new or worsening symptoms.     - You must understand that you have received an Urgent Care treatment only and that you may be released before all of your medical problems are known or treated.   - You, the patient, will arrange for follow up care as instructed.   - If your condition worsens or fails to improve we recommend that you receive another evaluation at the ER immediately or contact your PCP to discuss your concerns or return here.

## 2023-06-01 ENCOUNTER — TELEPHONE (OUTPATIENT)
Dept: ORTHOPEDICS | Facility: CLINIC | Age: 53
End: 2023-06-01
Payer: MEDICAID

## 2023-06-01 ENCOUNTER — PATIENT MESSAGE (OUTPATIENT)
Dept: ORTHOPEDICS | Facility: CLINIC | Age: 53
End: 2023-06-01
Payer: MEDICAID

## 2023-06-01 NOTE — TELEPHONE ENCOUNTER
I can see this, but it would need to go in next available medicaid slot. Not sure she will be able to see me before I leave.     If not, give her medicaid escalation line and the number of the outside practices that take medicaid.

## 2023-06-01 NOTE — TELEPHONE ENCOUNTER
Spoke topt she was informed the medicaid panel for this month has been fill. Patient was given medicaid hotline number Patient also stated she has other providers she will call to see about an appt.

## 2023-06-01 NOTE — TELEPHONE ENCOUNTER
----- Message from Cali Rosenbaum MA sent at 6/1/2023  1:30 PM CDT -----  Contact: Pt  Do you see this?:Triceps tendinitis [M77.8    ----- Message -----  From: Ronald Calzada  Sent: 5/30/2023  10:09 AM CDT  To: [facility] Staff     .Type:  Sooner Apoointment Request    Caller is requesting a sooner appointment.  Caller declined first available appointment listed below.  Caller will not accept being placed on the waitlist and is requesting a message be sent to doctor.  Name of Caller:pt  When is the first available appointment?  Symptoms:Triceps tendinitis [M77.8  Would the patient rather a call back or a response via [facility]?  Call back   Best Call Back Number:[phone]  Additional Information: Pt. Is returning a call back to mya her appt.

## 2023-06-16 ENCOUNTER — PATIENT MESSAGE (OUTPATIENT)
Dept: PODIATRY | Facility: CLINIC | Age: 53
End: 2023-06-16
Payer: MEDICAID

## 2023-11-04 ENCOUNTER — OFFICE VISIT (OUTPATIENT)
Dept: URGENT CARE | Facility: CLINIC | Age: 53
End: 2023-11-04
Payer: MEDICAID

## 2023-11-04 VITALS
OXYGEN SATURATION: 96 % | RESPIRATION RATE: 18 BRPM | HEART RATE: 61 BPM | SYSTOLIC BLOOD PRESSURE: 155 MMHG | TEMPERATURE: 98 F | DIASTOLIC BLOOD PRESSURE: 99 MMHG | BODY MASS INDEX: 32.32 KG/M2 | HEIGHT: 65 IN | WEIGHT: 194 LBS

## 2023-11-04 DIAGNOSIS — M79.604 LEG PAIN, ANTERIOR, RIGHT: Primary | ICD-10-CM

## 2023-11-04 PROCEDURE — 99213 PR OFFICE/OUTPT VISIT, EST, LEVL III, 20-29 MIN: ICD-10-PCS | Mod: S$GLB,,,

## 2023-11-04 PROCEDURE — 99213 OFFICE O/P EST LOW 20 MIN: CPT | Mod: S$GLB,,,

## 2023-11-04 NOTE — PROGRESS NOTES
"Subjective:      Patient ID: Bri Roque is a 53 y.o. female.    Vitals:  height is 5' 5" (1.651 m) and weight is 88 kg (194 lb 0.1 oz). Her oral temperature is 98 °F (36.7 °C). Her blood pressure is 155/99 (abnormal) and her pulse is 61. Her respiration is 18 and oxygen saturation is 96%.     Chief Complaint: Leg Pain    Pt present to clinic with right leg pain swollen and knot. Started 1 week ago. Treatment include tylenol with no relief. Denies Ibuprofen use.     Leg Pain   The incident occurred more than 1 week ago. The incident occurred at home. There was no injury mechanism. The pain is present in the right leg. The quality of the pain is described as aching. The pain is at a severity of 0/10. The patient is experiencing no pain. Associated symptoms include tingling. She reports no foreign bodies present. The symptoms are aggravated by movement. She has tried acetaminophen for the symptoms. The treatment provided no relief.       Constitution: Negative for activity change, appetite change, chills and fever.   HENT:  Negative for ear pain, congestion, postnasal drip, sinus pain, sinus pressure and sore throat.    Neck: Negative for neck pain.   Cardiovascular:  Positive for leg swelling. Negative for chest pain, palpitations and sob on exertion.   Eyes:  Negative for foreign body in eye, eye discharge, eye itching, eye pain and eye redness.   Respiratory:  Negative for chest tightness, cough, sputum production, bloody sputum, shortness of breath and wheezing.    Gastrointestinal:  Negative for abdominal pain, nausea, vomiting, constipation and diarrhea.   Genitourinary:  Negative for dysuria, frequency, urgency and urine decreased.   Musculoskeletal:  Positive for pain (right lower leg). Negative for joint pain, joint swelling, abnormal ROM of joint and muscle ache.   Skin:  Negative for color change, rash, erythema and bruising.   Neurological:  Negative for altered mental status. "   Psychiatric/Behavioral:  Negative for altered mental status and confusion.     Past Medical History:   Diagnosis Date    Anemia     Diabetes mellitus     No blood products        Past Surgical History:   Procedure Laterality Date    COLONOSCOPY N/A 11/17/2022    Procedure: COLONOSCOPY;  Surgeon: Vicki Forte MD;  Location: [facility];  Service: Endoscopy;  Laterality: N/A;    HYSTERECTOMY  2017    Dr Lima    TUBAL LIGATION      VAGINAL DELIVERY       x 2       Family History   Problem Relation Age of Onset    Cancer Mother         cervical    Diabetes Mother     Heart disease Father     Diabetes Father     Stroke Maternal Grandmother     Diabetes Maternal Grandmother     Heart disease Maternal Grandfather     Heart disease Paternal Grandfather     Diabetes Brother     Diabetes Brother     Diabetes Brother        Social History     Socioeconomic History    Marital status: Single   Tobacco Use    Smoking status: Never     Passive exposure: Never    Smokeless tobacco: Never   Substance and Sexual Activity    Alcohol use: No    Drug use: No    Sexual activity: Never       Current Outpatient Medications   Medication Sig Dispense Refill    fluconazole (DIFLUCAN) 150 MG Tab fluconazole 150 mg tablet   TAKE 1 TABLET BY MOUTH EVERY DAY      latanoprost 0.005 % ophthalmic solution       losartan (COZAAR) 50 MG tablet Take 50 mg by mouth once daily.      losartan (COZAAR) 50 MG tablet Take 1 tablet by mouth once daily.      ONETOUCH DELICA PLUS LANCET 33 gauge Misc Apply topically 3 (three) times daily.      OZEMPIC 0.25 mg or 0.5 mg(2 mg/1.5 mL) pen injector Inject into the skin.      semaglutide (OZEMPIC) 1 mg/dose (4 mg/3 mL) Ozempic 1 mg/dose (4 mg/3 mL) subcutaneous pen injector       No current facility-administered medications for this visit.       Review of patient's allergies indicates:   Allergen Reactions    Metformin Diarrhea      Objective:     Physical Exam    Constitutional: She is oriented to person, place, and time. She appears well-developed. She is cooperative.  Non-toxic appearance. She does not appear ill. No distress.   HENT:   Head: Normocephalic and atraumatic.   Ears:   Right Ear: Hearing, tympanic membrane, external ear and ear canal normal.   Left Ear: Hearing, tympanic membrane, external ear and ear canal normal.   Nose: Nose normal. No mucosal edema, rhinorrhea, nasal deformity or congestion. No epistaxis. Right sinus exhibits no maxillary sinus tenderness and no frontal sinus tenderness. Left sinus exhibits no maxillary sinus tenderness and no frontal sinus tenderness.   Mouth/Throat: Uvula is midline, oropharynx is clear and moist and mucous membranes are normal. Mucous membranes are moist. No trismus in the jaw. Normal dentition. No uvula swelling. Oropharynx is clear.   Eyes: Conjunctivae and lids are normal. Pupils are equal, round, and reactive to light. Extraocular movement intact   Neck: Trachea normal and phonation normal. Neck supple.   Cardiovascular: Normal rate, regular rhythm, normal heart sounds and normal pulses.   Pulmonary/Chest: Effort normal and breath sounds normal. No respiratory distress. She has no wheezes.   Abdominal: Normal appearance and bowel sounds are normal. Soft.   Musculoskeletal: Normal range of motion.         General: Tenderness present. No deformity or signs of injury. Normal range of motion.      Right lower leg: She exhibits tenderness (anterior calf. posterior calf nontender). She exhibits no deformity and no laceration. 2+ Pitting Edema present.      Left lower leg: Normal. No edema.        Legs:       Comments: Negative homans sign   Neurological: She is alert and oriented to person, place, and time. She exhibits normal muscle tone. GCS eye subscore is 4. GCS verbal subscore is 5. GCS motor subscore is 6.   Skin: Skin is warm, dry and intact. not right lower legNo erythema   Psychiatric: Her speech is normal and  behavior is normal. Judgment and thought content normal.   Nursing note and vitals reviewed.    Assessment:     1. Leg pain, anterior, right        Plan:     I have reviewed the patient chart and pertinent past imaging/labs.   Differential diagnosis includes DVT, which is less likely due to Wells score of -1 and lack of symptoms reported by patient. Pt denies SOB, CP, change in ROM, entire leg swelling, or tenderness along the venous system. Wells score is -1. To confidently r/o DVT, pt is in agreeance for a scheduled U/S during the week. Pt encouraged to go to ER if symptoms worsen or if CP, SOB, entire leg swelling, tenderness along the venous system develop. She acknowledges understanding.   Leg pain, anterior, right  -     US Lower Extremity Venous Insufficiency Right; Future; Expected date: 11/04/2023        Patient Instructions   Follow up for ultrasound. If no call by Tuesday morning, call [phone].  If symptoms worsen, shortness of breath, chest pain, change in skin color develop please go to ER.  Begin taking Ibuprofen for swelling and pain.   Continue icing and elevating the area.  Please drink plenty of fluids.  Please get plenty of rest.  Please return here or go to the Emergency Department for any concerns or worsening of condition.  If you were prescribed a narcotic medication, do not drive or operate heavy equipment or machinery while taking these medications.  If you were not prescribed an anti-inflammatory medication, and if you do not have any history of stomach/intestinal ulcers, or kidney disease, or are not taking a blood thinner such as Coumadin, Plavix, Pradaxa, Eloquis, or Xaralta for example, it is OK to take over the counter Ibuprofen or Advil or Motrin or Aleve as directed.  Do not take these medications on an empty stomach.  Rest, ice, compression and elevation to the affected joint or limb as needed.  Please follow up with your primary care doctor or specialist as needed.    If you   smoke, please stop smoking.

## 2023-11-04 NOTE — PATIENT INSTRUCTIONS
Follow up for ultrasound. If no call by Tuesday morning, call [phone].  If symptoms worsen, shortness of breath, chest pain, change in skin color develop please go to ER.  Begin taking Ibuprofen for swelling and pain.   Continue icing and elevating the area.  Please drink plenty of fluids.  Please get plenty of rest.  Please return here or go to the Emergency Department for any concerns or worsening of condition.  If you were prescribed a narcotic medication, do not drive or operate heavy equipment or machinery while taking these medications.  If you were not prescribed an anti-inflammatory medication, and if you do not have any history of stomach/intestinal ulcers, or kidney disease, or are not taking a blood thinner such as Coumadin, Plavix, Pradaxa, Eloquis, or Xaralta for example, it is OK to take over the counter Ibuprofen or Advil or Motrin or Aleve as directed.  Do not take these medications on an empty stomach.  Rest, ice, compression and elevation to the affected joint or limb as needed.  Please follow up with your primary care doctor or specialist as needed.    If you  smoke, please stop smoking.

## 2024-03-11 NOTE — PLAN OF CARE
Problem: Patient Care Overview  Goal: Plan of Care Review  Outcome: Ongoing (interventions implemented as appropriate)  VSS. NAD. Awaiting first void. Pain well controlled with prn pain meds. Discussed POC, pain management, ambulating and hystion. Pt verbalizes understanding.        Yandel Mendez DO

## 2024-05-25 ENCOUNTER — OFFICE VISIT (OUTPATIENT)
Dept: URGENT CARE | Facility: CLINIC | Age: 54
End: 2024-05-25
Payer: MEDICAID

## 2024-05-25 VITALS
WEIGHT: 194 LBS | BODY MASS INDEX: 32.32 KG/M2 | HEART RATE: 66 BPM | TEMPERATURE: 98 F | RESPIRATION RATE: 18 BRPM | OXYGEN SATURATION: 97 % | SYSTOLIC BLOOD PRESSURE: 126 MMHG | HEIGHT: 65 IN | DIASTOLIC BLOOD PRESSURE: 85 MMHG

## 2024-05-25 DIAGNOSIS — H00.014 HORDEOLUM EXTERNUM OF LEFT UPPER EYELID: ICD-10-CM

## 2024-05-25 DIAGNOSIS — H10.31 ACUTE CONJUNCTIVITIS OF RIGHT EYE, UNSPECIFIED ACUTE CONJUNCTIVITIS TYPE: Primary | ICD-10-CM

## 2024-05-25 DIAGNOSIS — L84 CALLUS OF FOOT: ICD-10-CM

## 2024-05-25 PROCEDURE — 99213 OFFICE O/P EST LOW 20 MIN: CPT | Mod: S$GLB,,, | Performed by: FAMILY MEDICINE

## 2024-05-25 RX ORDER — ERYTHROMYCIN 5 MG/G
OINTMENT OPHTHALMIC 3 TIMES DAILY
Qty: 3.5 G | Refills: 1 | Status: SHIPPED | OUTPATIENT
Start: 2024-05-25

## 2024-05-25 NOTE — PROGRESS NOTES
"Subjective:      Patient ID: Bri Roque is a 54 y.o. female.    Vitals:  height is 5' 5" (1.651 m) and weight is 88 kg (194 lb 0.1 oz). Her oral temperature is 97.9 °F (36.6 °C). Her blood pressure is 126/85 and her pulse is 66. Her respiration is 18 and oxygen saturation is 97%.     Chief Complaint: Abscess    Pt present with possible cyst under arm, under RT foot, on LR eye, private area. Started 2 weeks ago. Pt states she wnt to her PCP and got treated for the cyst on her foot, states its not getting any better coming back.     Abscess  Chronicity:  RecurrentProgression Since Onset: gradually worsening  Location:  Face  Associated Symptoms: no fever, no chills, no sweats  Characteristics: painful, redness, peeling, swelling and blistering    Characteristics: not draining, no itching, no dryness, no scaling and no bruising    Pain Scale:  5/10  Treatments Tried:  Nothing (ice)  Relieved by:  Nothing  Worsened by:  Nothing      Constitution: Negative for chills and fever.   Skin:  Positive for abscess. Negative for erythema.      Objective:     Physical Exam   Constitutional: She is oriented to person, place, and time. She appears well-developed. She is cooperative.  Non-toxic appearance. She does not appear ill. No distress.   HENT:   Head: Normocephalic and atraumatic. Head is without abrasion, without contusion and without laceration.   Ears:   Right Ear: Hearing, tympanic membrane, external ear and ear canal normal.   Left Ear: Hearing, tympanic membrane, external ear and ear canal normal.   Nose: Nose normal. No mucosal edema, rhinorrhea or nasal deformity. No epistaxis. Right sinus exhibits no maxillary sinus tenderness and no frontal sinus tenderness. Left sinus exhibits no maxillary sinus tenderness and no frontal sinus tenderness.   Mouth/Throat: Uvula is midline, oropharynx is clear and moist and mucous membranes are normal. No trismus in the jaw. Normal dentition. No uvula swelling. No " oropharyngeal exudate, posterior oropharyngeal edema or posterior oropharyngeal erythema.   Eyes: Conjunctivae, EOM and lids are normal. Pupils are equal, round, and reactive to light. No scleral icterus.   Neck: Trachea normal and phonation normal. Neck supple. No edema present. No erythema present. No neck rigidity present.   Cardiovascular: Normal rate, regular rhythm, normal heart sounds and normal pulses.   Pulmonary/Chest: Effort normal and breath sounds normal. No stridor. No respiratory distress. She has no decreased breath sounds. She has no rhonchi.   Abdominal: Normal appearance.   Musculoskeletal: Normal range of motion.         General: No deformity. Normal range of motion.   Neurological: She is alert and oriented to person, place, and time. She exhibits normal muscle tone. Coordination normal.   Skin: Skin is warm, dry, intact, not diaphoretic, not pale and no rash. Capillary refill takes less than 2 seconds. No abrasion, No burn, No bruising, No erythema and No ecchymosis   Psychiatric: Her speech is normal and behavior is normal. Judgment and thought content normal.   Nursing note and vitals reviewed.      Assessment:     1. Acute conjunctivitis of right eye, unspecified acute conjunctivitis type    2. Hordeolum externum of left upper eyelid    3. Callus of foot        Plan:       Acute conjunctivitis of right eye, unspecified acute conjunctivitis type  -     erythromycin (ROMYCIN) ophthalmic ointment; Place into the right eye 3 (three) times daily.  Dispense: 3.5 g; Refill: 1    Hordeolum externum of left upper eyelid  -     erythromycin (ROMYCIN) ophthalmic ointment; Place into the right eye 3 (three) times daily.  Dispense: 3.5 g; Refill: 1    Callus of foot  No signs of infection but pt advised trying to maral or excise due to risk of infection         Medical Decision Making:   Initial Assessment:   Pt reports that she had what sounds like a cyst in her axilla because she put a needle in to  percutaneously drain herself and got a serous fluid to come out, however there is nothing there currently.  Pt also did the same thing with the callus which she trying to excise herself          Thank you for choosing [facility]!     Our goal in the Urgent Care is to always provide outstanding medical care. You may receive a survey by mail or e-mail in the next week regarding your experience today. We would greatly appreciate you completing and returning the survey. Your feedback provides us with a way to recognize our staff who provide very good care, and it helps us learn how to improve when your experience was below our aspiration of excellence.       We appreciate you trusting us with your medical care. We hope you feel better soon. We will be happy to take care of you for all of your future medical needs.  You must understand that you've received an Urgent Care treatment only and that you may be released before all your medical problems are known or treated. You, the patient, will arrange for follow up care as instructed.  Follow up with your PCP or specialty clinic as directed in the next 1-2 weeks if not improved or as needed.  You can call [phone] to schedule an appointment with the appropriate provider.  Another option is to follow up with [facility] ([email or web address]) virtually for quick simple medical advice.  If your condition worsens we recommend that you receive another evaluation at the emergency room immediately or contact your primary medical clinics after hours call service to discuss your concerns.  Please return here or go to the Emergency Department for any concerns or worsening of condition.      *If you were prescribed a narcotic or controlled medication, do not drive or operate heavy equipment or machinery while taking these medications.

## 2024-07-31 ENCOUNTER — OFFICE VISIT (OUTPATIENT)
Dept: URGENT CARE | Facility: CLINIC | Age: 54
End: 2024-07-31
Payer: MEDICAID

## 2024-07-31 VITALS
TEMPERATURE: 98 F | OXYGEN SATURATION: 99 % | RESPIRATION RATE: 16 BRPM | HEIGHT: 65 IN | BODY MASS INDEX: 28.32 KG/M2 | SYSTOLIC BLOOD PRESSURE: 154 MMHG | HEART RATE: 65 BPM | DIASTOLIC BLOOD PRESSURE: 95 MMHG | WEIGHT: 170 LBS

## 2024-07-31 DIAGNOSIS — M79.601 RIGHT ARM PAIN: ICD-10-CM

## 2024-07-31 DIAGNOSIS — M54.9 UPPER BACK PAIN ON RIGHT SIDE: Primary | ICD-10-CM

## 2024-07-31 PROCEDURE — 99213 OFFICE O/P EST LOW 20 MIN: CPT | Mod: S$GLB,,, | Performed by: NURSE PRACTITIONER

## 2024-07-31 RX ORDER — MELOXICAM 15 MG/1
15 TABLET ORAL DAILY PRN
Qty: 15 TABLET | Refills: 0 | Status: SHIPPED | OUTPATIENT
Start: 2024-07-31

## 2024-07-31 RX ORDER — KETOROLAC TROMETHAMINE 30 MG/ML
30 INJECTION, SOLUTION INTRAMUSCULAR; INTRAVENOUS
Status: COMPLETED | OUTPATIENT
Start: 2024-07-31 | End: 2024-07-31

## 2024-07-31 RX ADMIN — KETOROLAC TROMETHAMINE 30 MG: 30 INJECTION, SOLUTION INTRAMUSCULAR; INTRAVENOUS at 08:07

## 2024-07-31 NOTE — PATIENT INSTRUCTIONS
DO NOT TAKE ANY MORE NAPROXEN OR IBUPROFEN FOR 24 HOURS AS YOU RECEIVED A LARGE DOSE OF IT VIA INJECTION     PLEASE READ YOUR DISCHARGE INSTRUCTIONS ENTIRELY AS IT CONTAINS IMPORTANT INFORMATION.        Please drink plenty of fluids.    Please get plenty of rest.    Ice to the area.     You may do gently stretching if tolerable.     Please return here or go to the Emergency Department for any concerns or worsening of condition.        If you were not prescribed an anti-inflammatory medication, and if you do not have any history of stomach/intestinal ulcers, or kidney disease, or are not taking a blood thinner such as Coumadin, Plavix, Pradaxa, Eloquis, or Xaralta for example, it is OK to take over the counter Ibuprofen or Advil or Motrin or Aleve as directed.  Do not take these medications on an empty stomach.     Do not stay in one position to long.  When sleeping on your back place a pillow under knees to reduce tension on back.  If sleeping on your side, place pillow between knees to keep spine in better alinement.  Wear supportive shoes such as tennis shoes for support of the lower back.  Take any medication as directed.     Over the counter creams like icy hot, salon pas, biofreeze to the area     Avoid heavy lifting or straining. Good posture. Do not look down at a computer or phone all day. Do not slouch when you drive       Go to the ER if you develop headache or fever.     Please follow up with your primary care doctor or specialist as needed.     If you  smoke, please stop smoking.        Please arrange follow up with your primary medical clinic as soon as possible. You must understand that you've received an Urgent Care treatment only and that you may be released before all of your medical problems are known or treated. You, the patient, will arrange for follow up as instructed. If your symptoms worsen or fail to improve you should go to the Emergency Room.

## 2024-07-31 NOTE — PROGRESS NOTES
"Subjective:      Patient ID: Bri Roque is a 54 y.o. female.    Vitals:  height is 5' 5" (1.651 m) and weight is 77.1 kg (170 lb). Her oral temperature is 97.9 °F (36.6 °C). Her blood pressure is 154/95 (abnormal) and her pulse is 65. Her respiration is 16 and oxygen saturation is 99%.     Chief Complaint: Shoulder Pain (Right Shoulder and arm pain)    54 year old female presenting with right shoulder and arm pain x 3 weeks  Provider note below:  This is a 54 y.o. female who presents today with a chief complaint of right upper back pain that radiates down to her right arm, ongoing for 3 weeks, denies any trauma fall or injury, denies numbness or tingling, patient reports this is the same kind of pain she had it before and ongoing for more than one year, patient reports she was seen by Orthopedics and was given the injection that helped she was also prescribed the medication to take by mouth diclofenac but reports did not help with her pain, patient reports she was told that it is likely because of the nerve pain, patient reports she did not follow up with specialist for sometime now, patient denies any right shoulder pain, denies any muscle spasms or muscle tightness, denies fever, body aches or chills, denies cough, wheezing or shortness of breath, denies nausea, vomiting, diarrhea or abdominal pain, denies chest pain or dizziness positional lightheadedness, denies sore throat or trouble swallowing, denies loss of taste or smell, or any other symptoms       Shoulder Pain   The pain is present in the right arm. This is a new problem. The current episode started 1 to 4 weeks ago (3 weeks ago). The problem occurs constantly. The problem has been gradually worsening. The quality of the pain is described as aching. The pain is at a severity of 8/10. The pain is moderate. Pertinent negatives include no fever, headaches, inability to bear weight, itching, joint locking, joint swelling, limited range of motion, " numbness, stiffness, tingling or visual symptoms. The symptoms are aggravated by activity. She has tried heat and acetaminophen for the symptoms. The treatment provided no relief. Family history includes arthritis. Her past medical history is significant for diabetes. There is no history of Injuries to Extremity or migraines.       Constitution: Negative for fever.   Musculoskeletal:  Positive for pain. Negative for abnormal ROM of joint.   Neurological:  Negative for headaches and numbness.      Objective:     Physical Exam   Constitutional: She is oriented to person, place, and time. She appears well-developed. She is cooperative. No distress.   HENT:   Head: Normocephalic and atraumatic.   Nose: Nose normal.   Mouth/Throat: Oropharynx is clear and moist and mucous membranes are normal.   Eyes: Conjunctivae and lids are normal.   Neck: Trachea normal and phonation normal. Neck supple.   Cardiovascular: Normal rate, regular rhythm, normal heart sounds and normal pulses.   Pulmonary/Chest: Effort normal and breath sounds normal.   Abdominal: Normal appearance and bowel sounds are normal. She exhibits no mass. Soft.   Musculoskeletal:         General: No deformity.      Right shoulder: Normal.      Thoracic back: Normal.      Comments: No erythema, swelling or tenderness noted to right shoulder/right arm, full range of motion intact of right arm,   No erythema, swelling or tenderness noted to thoracic back area  Cap refill 2 seconds, right radial pulse 2+, sensation intact       Neurological: She is alert and oriented to person, place, and time. She has normal strength and normal reflexes. No sensory deficit.   Skin: Skin is warm, dry, intact and not diaphoretic.   Psychiatric: Her speech is normal and behavior is normal. Judgment and thought content normal.   Nursing note and vitals reviewed.        Patient in no acute distress.  Vitals reassuring.  Discussed results/diagnosis/plan in depth with patient in clinic.  Strict precautions given to patient to monitor for worsening signs and symptoms. Advised to follow up with primary.All questions answered. Strict ER precautions given. If your symptoms worsens or fail to improve you should go to the Emergency Room. Discharge and follow-up instructions given verbally/printed. Discharge and follow-up instructions discussed with the patient who expressed understanding and willingness to comply with my recommendations.Patient voiced understanding and in agreement with current treatment plan.     Please be advised this text was dictated with [employer] software and may contain errors due to translation.   Assessment:     1. Upper back pain on right side    2. Right arm pain        Plan:       Upper back pain on right side  -     ketorolac injection 30 mg  -     Ambulatory referral/consult to Orthopedics  -     meloxicam (MOBIC) 15 MG tablet; Take 1 tablet (15 mg total) by mouth daily as needed for Pain.  Dispense: 15 tablet; Refill: 0    Right arm pain  -     ketorolac injection 30 mg  -     Ambulatory referral/consult to Orthopedics  -     meloxicam (MOBIC) 15 MG tablet; Take 1 tablet (15 mg total) by mouth daily as needed for Pain.  Dispense: 15 tablet; Refill: 0          Medical Decision Making:   History:   Old Medical Records: I decided to obtain old medical records.  Old Records Summarized: records from clinic visits and records from previous admission(s).  Urgent Care Management:  Patient in no acute distress.  Vitals reassuring.  On exam, patient is nontoxic appearing and afebrile.  Lungs CTA.  Physical examination as above.  Patient reports pain intermittently ongoing for past mor than one year, seen previously for the same pain and followed up with Orthopedics given injection that helped and prescribe diclofenac which is not helping at all.  Recent pain started 3 weeks ago.  Denies any exertional chest pain.  Patient reports pain is same as previous episodes that is ongoing for  more than 1 year.  Toradol injection given in clinic.  Detailed education provided about side effects and recommendations.  Will prescribe Mobic and refer her back to Orthopedics for further evaluation.  Medication prescribed and over-the-counter medication discussed with patient at length.  Proper hydration advised.  I reiterated the importance of further evaluation if no improvement symptoms and follow-up with primary. Patient voiced understanding and in agreement with current treatment plan.           Patient Instructions   DO NOT TAKE ANY MORE NAPROXEN OR IBUPROFEN FOR 24 HOURS AS YOU RECEIVED A LARGE DOSE OF IT VIA INJECTION     PLEASE READ YOUR DISCHARGE INSTRUCTIONS ENTIRELY AS IT CONTAINS IMPORTANT INFORMATION.        Please drink plenty of fluids.    Please get plenty of rest.    Ice to the area.     You may do gently stretching if tolerable.     Please return here or go to the Emergency Department for any concerns or worsening of condition.        If you were not prescribed an anti-inflammatory medication, and if you do not have any history of stomach/intestinal ulcers, or kidney disease, or are not taking a blood thinner such as Coumadin, Plavix, Pradaxa, Eloquis, or Xaralta for example, it is OK to take over the counter Ibuprofen or Advil or Motrin or Aleve as directed.  Do not take these medications on an empty stomach.     Do not stay in one position to long.  When sleeping on your back place a pillow under knees to reduce tension on back.  If sleeping on your side, place pillow between knees to keep spine in better alinement.  Wear supportive shoes such as tennis shoes for support of the lower back.  Take any medication as directed.     Over the counter creams like icy hot, salon pas, biofreeze to the area     Avoid heavy lifting or straining. Good posture. Do not look down at a computer or phone all day. Do not slouch when you drive       Go to the ER if you develop headache or fever.     Please  follow up with your primary care doctor or specialist as needed.     If you  smoke, please stop smoking.        Please arrange follow up with your primary medical clinic as soon as possible. You must understand that you've received an Urgent Care treatment only and that you may be released before all of your medical problems are known or treated. You, the patient, will arrange for follow up as instructed. If your symptoms worsen or fail to improve you should go to the Emergency Room.

## 2025-02-01 ENCOUNTER — OFFICE VISIT (OUTPATIENT)
Dept: URGENT CARE | Facility: CLINIC | Age: 55
End: 2025-02-01
Payer: MEDICAID

## 2025-02-01 VITALS
RESPIRATION RATE: 19 BRPM | BODY MASS INDEX: 28.28 KG/M2 | DIASTOLIC BLOOD PRESSURE: 88 MMHG | OXYGEN SATURATION: 98 % | TEMPERATURE: 99 F | SYSTOLIC BLOOD PRESSURE: 155 MMHG | HEIGHT: 65 IN | WEIGHT: 169.75 LBS | HEART RATE: 62 BPM

## 2025-02-01 DIAGNOSIS — R05.8 OTHER COUGH: ICD-10-CM

## 2025-02-01 DIAGNOSIS — J01.90 ACUTE BACTERIAL SINUSITIS: Primary | ICD-10-CM

## 2025-02-01 DIAGNOSIS — S16.1XXA STRAIN OF NECK MUSCLE, INITIAL ENCOUNTER: ICD-10-CM

## 2025-02-01 DIAGNOSIS — B96.89 ACUTE BACTERIAL SINUSITIS: Primary | ICD-10-CM

## 2025-02-01 DIAGNOSIS — J34.89 NASAL CONGESTION WITH RHINORRHEA: ICD-10-CM

## 2025-02-01 DIAGNOSIS — R09.81 NASAL CONGESTION WITH RHINORRHEA: ICD-10-CM

## 2025-02-01 PROBLEM — E11.9 TYPE 2 DIABETES MELLITUS: Status: ACTIVE | Noted: 2022-07-07

## 2025-02-01 PROBLEM — I10 ESSENTIAL HYPERTENSION: Status: ACTIVE | Noted: 2022-07-21

## 2025-02-01 PROCEDURE — 99214 OFFICE O/P EST MOD 30 MIN: CPT | Mod: S$GLB,,, | Performed by: PHYSICIAN ASSISTANT

## 2025-02-01 RX ORDER — IBUPROFEN 800 MG/1
800 TABLET ORAL EVERY 6 HOURS PRN
Qty: 30 TABLET | Refills: 0 | Status: SHIPPED | OUTPATIENT
Start: 2025-02-02 | End: 2025-02-12

## 2025-02-01 RX ORDER — KETOROLAC TROMETHAMINE 30 MG/ML
30 INJECTION, SOLUTION INTRAMUSCULAR; INTRAVENOUS
Status: COMPLETED | OUTPATIENT
Start: 2025-02-01 | End: 2025-02-01

## 2025-02-01 RX ORDER — PROMETHAZINE HYDROCHLORIDE AND DEXTROMETHORPHAN HYDROBROMIDE 6.25; 15 MG/5ML; MG/5ML
5 SYRUP ORAL EVERY 6 HOURS PRN
Qty: 180 ML | Refills: 0 | Status: SHIPPED | OUTPATIENT
Start: 2025-02-01 | End: 2025-02-15

## 2025-02-01 RX ORDER — AMOXICILLIN AND CLAVULANATE POTASSIUM 875; 125 MG/1; MG/1
1 TABLET, FILM COATED ORAL 2 TIMES DAILY
Qty: 20 TABLET | Refills: 0 | Status: SHIPPED | OUTPATIENT
Start: 2025-02-01 | End: 2025-02-11

## 2025-02-01 RX ORDER — METHOCARBAMOL 750 MG/1
750 TABLET, FILM COATED ORAL 3 TIMES DAILY PRN
Qty: 21 TABLET | Refills: 0 | Status: SHIPPED | OUTPATIENT
Start: 2025-02-01 | End: 2025-02-08

## 2025-02-01 RX ORDER — LORATADINE 10 MG/1
10 TABLET ORAL DAILY PRN
Qty: 30 TABLET | Refills: 0 | Status: SHIPPED | OUTPATIENT
Start: 2025-02-01 | End: 2025-03-03

## 2025-02-01 RX ORDER — FLUTICASONE PROPIONATE 50 MCG
2 SPRAY, SUSPENSION (ML) NASAL DAILY PRN
Qty: 15.8 ML | Refills: 0 | Status: SHIPPED | OUTPATIENT
Start: 2025-02-01 | End: 2025-03-03

## 2025-02-01 RX ADMIN — KETOROLAC TROMETHAMINE 30 MG: 30 INJECTION, SOLUTION INTRAMUSCULAR; INTRAVENOUS at 09:02

## 2025-02-01 NOTE — LETTER
"  February 1, 2025      [facility] [address]  [place] [address]  Phone: [phone]  Fax: [phone]       Patient: Bri Roque   YOB: 1970  Date of Visit: 02/01/2025    To Whom It May Concern:    Michelle Roque  was at [facility] on 02/01/2025. The patient may return to work/school on 2/2/25 with no restrictions. If you have any questions or concerns, or if I can be of further assistance, please do not hesitate to contact me.    Sincerely,        Gemashae Mark PA-C (Jackie)       "

## 2025-02-01 NOTE — PATIENT INSTRUCTIONS
Discussed with patient that if he/she is in pain today, only take tylenol due to toradol injection received in clinic. You can continue NSAIDS tomorrow such as ibuprofen (Motrin/Advil), naproxen (Aleve), Mobic (Meloxicam).     If you were prescribed a narcotic or muscle relaxer (Flexeril or Robaxin), do not drive or operate heavy equipment or machinery while taking these medications. These medications can make you drowsy. If you are driving, it is recommended to take ibuprofen TID prn pain and take flexeril/robaxin at night.  If not allergic, take Tylenol (Acetaminophen) 650 mg to  1 g every 6 hours as needed as needed for fever/pain and/or Motrin (Ibuprofen) 600 to 800 mg every 6 hours as needed for pain and/or fever    -----------------------------------------------------------------------------------------------------------------------------------------------------------------------------------------  Recommend oral antihistamine (claritin, zyrtec, allegra,xyzal),oral decongestant (pseudoephedrine)  for rhinorrhea/ear congestion <3 days if blood pressure is <130/80mmhg, steroid nasal spray (flonase), prescription (promethazine-DM) at night due to drowsiness  /OTC cough medicine (Coricidin HBP Chest Congestion & Cough or Mucinex DM 12 hour (Dextromethorphan + Guaifenesin)),  Tylenol (Acetaminophen) and/or Motrin (Ibuprofen) as directed for control of pain and/or fever.      Please drink plenty of fluids.  Please get plenty of rest.  Nasal irrigation with a saline spray or Netti Pot like device per their directions is also recommended.  If you  smoke, please stop smoking.    To help ease a sore throat, you can:  Use a sore throat spray.  Suck on hard candy or throat lozenges.  Gargle with warm saltwater a few times each day. Mix of 1/4 teaspoon (1.25 grams) salt in 8 ounces (240 mL) of warm water.  Use a cool mist humidifier to help you breathe easier.    If you negative (-) for a COVID test today and you are  continuing to have symptoms, it is recommended to repeat the test in 48 hours x 3. If you continue to be negative, you may return to school/work once you have improved symptoms and no fever for 24 hours without any medications. This applies to all viral illnesses.     Discussed prescriptions and over-the-counter medicines to help with patient's symptoms:  A steroid nose spray (flonase) and antihistamine nasal spray (azelastine) can help with a stuffy nose. It can also help with drainage down the back of your throat.  An antihistamine (loratadine,zyrtec,allegra, xyzal) can help with itching, sneezing, or runny nose.  An antihistamine eye drop can help with itchy eyes.  A decongestant (pseudoephedrine,  Phenylephrine, oxymetazoline aka afrin nasal spray) can help with a stuffy nose. Take <10 days for congestion and rhinorrhea. Once symptoms improve, proceed with loratadine/zyrtec once a day. These ingredients can keep you up all night, decrease appetite, feel jittery, and raise blood pressure with long term use.  OTC Coricidin can be used for patients with hypertension and palpitations because you cannot use ingredients such as pseudoephedrine and phenylephrine for oral decongestants.  Coricidin HBP Cough & Cold (Chlorpheniramine/Dextromethorphan)  Coricidin HBP Maximum Strength Multi-Symptom Flu  (Acetaminophen,Dextromethorphan, Chlorpheniramine)  Coricidin HBP® Maximum Strength Cold & Flu Day/Night (Acetaminophen,Dextromethorphan, Doxylamine, Guaifenesin)  Coricidin HBP Chest Congestion & Cough (Dextromethorphan + Guaifenesin)  Mucinex DM: Guaifenesin,Dextromethorphan    Medications that control cough are suppressants and expectorants. Suppressants are tessalon pearls and dextromethorphan. If you have a productive cough with sputum, you need an expectorant called guaifenesin. Dextromethorphan and Guaifenesin are active ingredients in many OTC cough/cold medications such as Dayquil/Nyquil, Mucinex, and Robitussin  Mucus+Chest Congestion.            Common Cold Medicine Ingredients Cheat sheet  Acetaminophen (APAP) -pain reliever/fever reducer  Dextromethorphan - cough suppressant  Guaifenesin - expectorant/thins and loosens mucus  Phenylephrine - nasal decongestant  Diphenhydramine or Doxylamine succinate - antihistamine, helps you fall asleep  Promethazine or Brompheniramine - Prescription strength antihistamines      Please remember that you have received care at an urgent care today. Urgent cares are not emergency rooms and are not equipped to handle life threatening emergencies and cannot rule in or out certain medical conditions and you may be released before all of your medical problems are known or treated.     Please arrange follow up with your primary care physician or speciality clinic within 2-5 days if your signs and symptoms have not resolved or worsen.     Patient can call our Referral Hotline at [phone] to make an appointment.      Please return here or go to the Emergency Department for any concerns or worsening of condition.  Signs of infection. These include a fever of 100.4°F (38°C) or higher, chills, cough, more sputum or change in color of sputum.  You are having so much trouble breathing that you can only say one or two words at a time.  You need to sit upright at all times to be able to breathe and or cannot lie down.  You have trouble breathing when talking or sitting still.  You have a fever of 100.4°F (38°C) or higher or chills.  You have chest pain when you cough, have trouble breathing but can still talk in full sentences, or cough up blood.

## 2025-02-01 NOTE — PROGRESS NOTES
"Subjective:      Patient ID: Bri Roque is a 54 y.o. female.    Vitals:  height is 5' 5" (1.651 m) and weight is 77 kg (169 lb 12.1 oz). Her oral temperature is 98.5 °F (36.9 °C). Her blood pressure is 155/88 (abnormal) and her pulse is 62. Her respiration is 19 and oxygen saturation is 98%.     Chief Complaint: Cough and Neck Pain    Bri Roque is a 54 y.o. female with hypertension and DM II who complains of  cough, runny nose, runny eyes, congestion. Sx started 1 week ago. Tx include OTC cough and cold (Vicks cough and cold) with no relief.     Pt also having RT side neck pain going to her RT shoulder started yesterday. Tx include tylenol extra strength every 8 hours with no relief.     Cough  This is a recurrent problem. The current episode started 1 to 4 weeks ago. The problem has been gradually worsening. The problem occurs constantly. The cough is Productive of sputum. Associated symptoms include headaches, myalgias, nasal congestion, postnasal drip and rhinorrhea. Pertinent negatives include no chest pain, chills, ear congestion, ear pain, fever, heartburn, hemoptysis, sore throat, shortness of breath, sweats, weight loss or wheezing. Nothing aggravates the symptoms. She has tried OTC cough suppressant for the symptoms. The treatment provided mild relief. There is no history of asthma, bronchiectasis, bronchitis, COPD, emphysema, environmental allergies or pneumonia.     Constitution: Negative for activity change, appetite change, chills, fatigue, fever and generalized weakness.   HENT:  Positive for congestion, postnasal drip, sinus pain and sinus pressure. Negative for ear pain, ear discharge, foreign body in ear, tinnitus, sore throat, trouble swallowing and voice change.    Neck: Positive for neck pain and neck stiffness. Negative for degenerative disc disease and bulging disc disease.   Cardiovascular:  Negative for chest pain, leg swelling and palpitations.   Respiratory:  Positive for " cough and sputum production. Negative for bloody sputum, shortness of breath, wheezing and asthma.    Gastrointestinal:  Negative for nausea, vomiting and heartburn.   Musculoskeletal:  Positive for pain, arthritis, muscle cramps and muscle ache. Negative for trauma, joint pain, joint swelling, abnormal ROM of joint, gout, back pain and history of spine disorder.   Allergic/Immunologic: Negative for environmental allergies, seasonal allergies, food allergies and asthma.   Neurological:  Positive for headaches.   Psychiatric/Behavioral:  Negative for nervous/anxious. The patient is not nervous/anxious.       Objective:     Physical Exam   Constitutional: She is oriented to person, place, and time. She is cooperative. No distress.      Comments:Patient is awake and alert, sitting up in exam chair, speaking and answering in complete sentences     normalawake  HENT:   Head: Normocephalic and atraumatic.      Comments: Patient observed breathing through mouth, sniffling, and frequently clearing throat.Tenderness to maxillary sinuses; pain elicited to areas when patient lean forward      Ears:   Right Ear: Tympanic membrane, external ear and ear canal normal.   Left Ear: Tympanic membrane, external ear and ear canal normal.   Nose: Mucosal edema, rhinorrhea, sinus tenderness and congestion present. Right sinus exhibits maxillary sinus tenderness. Right sinus exhibits no frontal sinus tenderness. Left sinus exhibits maxillary sinus tenderness. Left sinus exhibits no frontal sinus tenderness.   Mouth/Throat: Uvula is midline, oropharynx is clear and moist and mucous membranes are normal. Mucous membranes are moist. No oropharyngeal exudate or posterior oropharyngeal erythema. Tonsils are 0 on the right. Tonsils are 0 on the left. No tonsillar exudate. Oropharynx is clear.      Comments:  postnasal discharge noted on the posterior pharyngeal wall    Eyes: Conjunctivae and lids are normal. Pupils are equal, round, and  reactive to light. Lids are everted and swept, no foreign bodies found. Extraocular movement intact vision grossly intact gaze aligned appropriately   Neck: Neck supple.      Comments: Decreased ROM with neck flexion, right neck rotation decreased range of motion present. pain with movement present. muscular tenderness present.   Cardiovascular: Normal rate, regular rhythm, normal heart sounds and normal pulses.   Pulmonary/Chest: Effort normal and breath sounds normal. No respiratory distress. She has no wheezes. She has no rhonchi. She has no rales.   Abdominal: Normal appearance.   Musculoskeletal:      Right shoulder: She exhibits tenderness. She exhibits normal range of motion and no swelling.      Left shoulder: Normal.      Cervical back: She exhibits no tenderness.   Lymphadenopathy:     She has no cervical adenopathy.   Neurological: She is alert and oriented to person, place, and time.   Skin: Skin is warm.   Psychiatric: Her behavior is normal. Mood, judgment and thought content normal.   Nursing note and vitals reviewed.      Assessment:     1. Acute bacterial sinusitis    2. Nasal congestion with rhinorrhea    3. Other cough    4. Strain of neck muscle, initial encounter      Patient presents with clinical exam findings and history consistent with above.      On exam, patient is nontoxic appearing and vitals are stable.      Diagnostic testing results were reviewed and discussed with patient/guardian.   Tests ordered in clinic: None  Previous progress notes/admissions/labs and medications were reviewed.  Reviewed GFR > 60 from CMP on 3/6/17.     Plan:       Acute bacterial sinusitis  -     amoxicillin-clavulanate 875-125mg (AUGMENTIN) 875-125 mg per tablet; Take 1 tablet by mouth 2 (two) times daily. for 10 days  Dispense: 20 tablet; Refill: 0  -     Ambulatory referral/consult to Internal Medicine    Nasal congestion with rhinorrhea  -     fluticasone propionate (FLONASE) 50 mcg/actuation nasal spray;  2 sprays (100 mcg total) by Each Nostril route daily as needed for Allergies or Rhinitis.  Dispense: 15.8 mL; Refill: 0  -     loratadine (CLARITIN) 10 mg tablet; Take 1 tablet (10 mg total) by mouth daily as needed for Allergies (Rhinitis, Congestion).  Dispense: 30 tablet; Refill: 0    Other cough  -     promethazine-dextromethorphan (PROMETHAZINE-DM) 6.25-15 mg/5 mL Syrp; Take 5 mLs by mouth every 6 (six) hours as needed (cough).  Dispense: 180 mL; Refill: 0  -     dextromethorphan-guaiFENesin  mg (MUCINEX DM)  mg per 12 hr tablet; Take 1 tablet by mouth 2 (two) times daily as needed (productive cough).  Dispense: 20 tablet; Refill: 0    Strain of neck muscle, initial encounter  -     ketorolac injection 30 mg  -     ibuprofen (ADVIL,MOTRIN) 800 MG tablet; Take 1 tablet (800 mg total) by mouth every 6 (six) hours as needed for Pain.  Dispense: 30 tablet; Refill: 0  -     methocarbamoL (ROBAXIN) 750 MG Tab; Take 1 tablet (750 mg total) by mouth 3 (three) times daily as needed (Muscle spasms/stiffness. Can cause drowsiness.).  Dispense: 21 tablet; Refill: 0                  1) See orders for this visit as documented in the electronic medical record.  2) Symptomatic therapy suggested: use acetaminophen/ibuprofen every 6-8 hours prn pain or fever, push fluids.   3) Call or return to clinic prn if these symptoms worsen or fail to improve as anticipated.    Discussed results/diagnosis/plan with patient in clinic.  We had shared decision making for patient's treatment. Patient verbalized understanding and in agreement with current treatment plan.     Patient was instructed to return for re-evaluation with urgent care or PCP for continued outpatient workup and management if symptoms do not improve/worsening symptoms. Strict ED versus clinic precautions given in depth.    Discharge and follow-up instructions given verbally/printed with the patient who expressed understanding. The instructions and results are  "also available on [employer].              [facility] "[place]" EVY Mark          Patient Instructions   Discussed with patient that if he/she is in pain today, only take tylenol due to toradol injection received in clinic. You can continue NSAIDS tomorrow such as ibuprofen (Motrin/Advil), naproxen (Aleve), Mobic (Meloxicam).     If you were prescribed a narcotic or muscle relaxer (Flexeril or Robaxin), do not drive or operate heavy equipment or machinery while taking these medications. These medications can make you drowsy. If you are driving, it is recommended to take ibuprofen TID prn pain and take flexeril/robaxin at night.  If not allergic, take Tylenol (Acetaminophen) 650 mg to  1 g every 6 hours as needed as needed for fever/pain and/or Motrin (Ibuprofen) 600 to 800 mg every 6 hours as needed for pain and/or fever    -----------------------------------------------------------------------------------------------------------------------------------------------------------------------------------------  Recommend oral antihistamine (claritin, zyrtec, allegra,xyzal),oral decongestant (pseudoephedrine)  for rhinorrhea/ear congestion <3 days if blood pressure is <130/80mmhg, steroid nasal spray (flonase), prescription (promethazine-DM) at night due to drowsiness  /OTC cough medicine (Coricidin HBP Chest Congestion & Cough or Mucinex DM 12 hour (Dextromethorphan + Guaifenesin)),  Tylenol (Acetaminophen) and/or Motrin (Ibuprofen) as directed for control of pain and/or fever.      Please drink plenty of fluids.  Please get plenty of rest.  Nasal irrigation with a saline spray or Netti Pot like device per their directions is also recommended.  If you  smoke, please stop smoking.    To help ease a sore throat, you can:  Use a sore throat spray.  Suck on hard candy or throat lozenges.  Gargle with warm saltwater a few times each day. Mix of 1/4 teaspoon (1.25 grams) salt in 8 ounces (240 mL) of warm water.  Use a cool mist " humidifier to help you breathe easier.    If you negative (-) for a COVID test today and you are continuing to have symptoms, it is recommended to repeat the test in 48 hours x 3. If you continue to be negative, you may return to school/work once you have improved symptoms and no fever for 24 hours without any medications. This applies to all viral illnesses.     Discussed prescriptions and over-the-counter medicines to help with patient's symptoms:  A steroid nose spray (flonase) and antihistamine nasal spray (azelastine) can help with a stuffy nose. It can also help with drainage down the back of your throat.  An antihistamine (loratadine,zyrtec,allegra, xyzal) can help with itching, sneezing, or runny nose.  An antihistamine eye drop can help with itchy eyes.  A decongestant (pseudoephedrine,  Phenylephrine, oxymetazoline aka afrin nasal spray) can help with a stuffy nose. Take <10 days for congestion and rhinorrhea. Once symptoms improve, proceed with loratadine/zyrtec once a day. These ingredients can keep you up all night, decrease appetite, feel jittery, and raise blood pressure with long term use.  OTC Coricidin can be used for patients with hypertension and palpitations because you cannot use ingredients such as pseudoephedrine and phenylephrine for oral decongestants.  Coricidin HBP Cough & Cold (Chlorpheniramine/Dextromethorphan)  Coricidin HBP Maximum Strength Multi-Symptom Flu  (Acetaminophen,Dextromethorphan, Chlorpheniramine)  Coricidin HBP® Maximum Strength Cold & Flu Day/Night (Acetaminophen,Dextromethorphan, Doxylamine, Guaifenesin)  Coricidin HBP Chest Congestion & Cough (Dextromethorphan + Guaifenesin)  Mucinex DM: Guaifenesin,Dextromethorphan    Medications that control cough are suppressants and expectorants. Suppressants are tessalon pearls and dextromethorphan. If you have a productive cough with sputum, you need an expectorant called guaifenesin. Dextromethorphan and Guaifenesin are active  ingredients in many OTC cough/cold medications such as Dayquil/Nyquil, Mucinex, and Robitussin Mucus+Chest Congestion.            Common Cold Medicine Ingredients Cheat sheet  Acetaminophen (APAP) -pain reliever/fever reducer  Dextromethorphan - cough suppressant  Guaifenesin - expectorant/thins and loosens mucus  Phenylephrine - nasal decongestant  Diphenhydramine or Doxylamine succinate - antihistamine, helps you fall asleep  Promethazine or Brompheniramine - Prescription strength antihistamines      Please remember that you have received care at an urgent care today. Urgent cares are not emergency rooms and are not equipped to handle life threatening emergencies and cannot rule in or out certain medical conditions and you may be released before all of your medical problems are known or treated.     Please arrange follow up with your primary care physician or speciality clinic within 2-5 days if your signs and symptoms have not resolved or worsen.     Patient can call our Referral Hotline at [phone] to make an appointment.      Please return here or go to the Emergency Department for any concerns or worsening of condition.  Signs of infection. These include a fever of 100.4°F (38°C) or higher, chills, cough, more sputum or change in color of sputum.  You are having so much trouble breathing that you can only say one or two words at a time.  You need to sit upright at all times to be able to breathe and or cannot lie down.  You have trouble breathing when talking or sitting still.  You have a fever of 100.4°F (38°C) or higher or chills.  You have chest pain when you cough, have trouble breathing but can still talk in full sentences, or cough up blood.

## 2025-09-06 ENCOUNTER — OFFICE VISIT (OUTPATIENT)
Dept: URGENT CARE | Facility: CLINIC | Age: 55
End: 2025-09-06
Payer: COMMERCIAL

## 2025-09-06 VITALS
HEART RATE: 60 BPM | OXYGEN SATURATION: 98 % | WEIGHT: 169 LBS | RESPIRATION RATE: 17 BRPM | BODY MASS INDEX: 28.16 KG/M2 | SYSTOLIC BLOOD PRESSURE: 144 MMHG | DIASTOLIC BLOOD PRESSURE: 84 MMHG | TEMPERATURE: 99 F | HEIGHT: 65 IN

## 2025-09-06 DIAGNOSIS — R09.81 SINUS CONGESTION: ICD-10-CM

## 2025-09-06 DIAGNOSIS — M79.602 LEFT ARM PAIN: Primary | ICD-10-CM

## 2025-09-06 DIAGNOSIS — R05.9 COUGH, UNSPECIFIED TYPE: ICD-10-CM

## 2025-09-06 DIAGNOSIS — J06.9 ACUTE URI: ICD-10-CM

## 2025-09-06 LAB
CTP QC/QA: YES
SARS-COV+SARS-COV-2 AG RESP QL IA.RAPID: NEGATIVE

## 2025-09-06 RX ORDER — KETOROLAC TROMETHAMINE 30 MG/ML
30 INJECTION, SOLUTION INTRAMUSCULAR; INTRAVENOUS
Status: COMPLETED | OUTPATIENT
Start: 2025-09-06 | End: 2025-09-06

## 2025-09-06 RX ADMIN — KETOROLAC TROMETHAMINE 30 MG: 30 INJECTION, SOLUTION INTRAMUSCULAR; INTRAVENOUS at 01:09

## (undated) DEVICE — SEE MEDLINE ITEM 154981

## (undated) DEVICE — ELECTRODE REM PLYHSV RETURN 9

## (undated) DEVICE — CANISTER SUCTION 2 LTR

## (undated) DEVICE — SUT 1 18IN COATED VICRYL UN

## (undated) DEVICE — CLEANER TIP ELECSURG 2X2IN

## (undated) DEVICE — PAD ABD 8X10 STERILE

## (undated) DEVICE — CLOSURE SKIN STERI STRIP 1/2X4

## (undated) DEVICE — SUT 1 36IN COATED VICRYL UN

## (undated) DEVICE — NDL 25G X 1IN NON SAFETY

## (undated) DEVICE — Device

## (undated) DEVICE — SUT 1 18IN COATED VICRYL U

## (undated) DEVICE — SUT CHROMIC 3-0 SH 27IN GUT

## (undated) DEVICE — ELECTRODE NEEDLE 2.8IN

## (undated) DEVICE — SEE MEDLINE ITEM 152622

## (undated) DEVICE — SUT 0 36IN CHROMIC GUT CT-1

## (undated) DEVICE — GLOVE SURG BIOGEL LATEX SZ 7.5

## (undated) DEVICE — SLEEVE SCD EXPRESS CALF MEDIUM

## (undated) DEVICE — POWDER ARISTA AH 3G

## (undated) DEVICE — BLANKET UPPER BODY 78.7X29.9IN

## (undated) DEVICE — SYRINGE SAFETY LOK 10CC 305559

## (undated) DEVICE — DRESSING AQUACEL AG 3.5X10IN

## (undated) DEVICE — SEALER LIGASURE IMPACT 18CM

## (undated) DEVICE — ELECTRODE BLADE TEFLON 6

## (undated) DEVICE — CHLORAPREP W TINT 26ML APPL

## (undated) DEVICE — SOL WATER STRL IRR 1000ML

## (undated) DEVICE — SPONGE LAP 18X18 PREWASHED

## (undated) DEVICE — SUPPORT ULNA NERVE PROTECTOR

## (undated) DEVICE — APPLICATOR ARISTA FLEX XL

## (undated) DEVICE — CAUTERY PUSHBUTTON PENCIL

## (undated) DEVICE — PACK ENDOSCOPY GENERAL

## (undated) DEVICE — GAUZE SPONGE 4X4 12PLY

## (undated) DEVICE — SEE MEDLINE ITEM 146417